# Patient Record
Sex: MALE | Race: WHITE | NOT HISPANIC OR LATINO | Employment: FULL TIME | URBAN - METROPOLITAN AREA
[De-identification: names, ages, dates, MRNs, and addresses within clinical notes are randomized per-mention and may not be internally consistent; named-entity substitution may affect disease eponyms.]

---

## 2017-01-09 DIAGNOSIS — E78.2 COMBINED HYPERLIPIDEMIA ASSOCIATED WITH TYPE 2 DIABETES MELLITUS: ICD-10-CM

## 2017-01-09 DIAGNOSIS — E11.69 COMBINED HYPERLIPIDEMIA ASSOCIATED WITH TYPE 2 DIABETES MELLITUS: ICD-10-CM

## 2017-01-09 RX ORDER — FUROSEMIDE 40 MG/1
40 TABLET ORAL 2 TIMES DAILY
Qty: 90 TABLET | Refills: 3 | Status: SHIPPED | OUTPATIENT
Start: 2017-01-09 | End: 2018-01-25 | Stop reason: SDUPTHER

## 2017-01-09 RX ORDER — GLIPIZIDE 5 MG/1
TABLET ORAL
Qty: 30 TABLET | Refills: 2 | Status: SHIPPED | OUTPATIENT
Start: 2017-01-09 | End: 2017-04-09 | Stop reason: SDUPTHER

## 2017-01-27 DIAGNOSIS — E11.9 TYPE 2 DIABETES MELLITUS WITHOUT COMPLICATION: ICD-10-CM

## 2017-02-08 DIAGNOSIS — G62.9 PERIPHERAL POLYNEUROPATHY: ICD-10-CM

## 2017-02-08 RX ORDER — GABAPENTIN 600 MG/1
TABLET ORAL
Qty: 180 TABLET | Refills: 11 | Status: SHIPPED | OUTPATIENT
Start: 2017-02-08 | End: 2018-01-20 | Stop reason: SDUPTHER

## 2017-02-15 ENCOUNTER — PATIENT MESSAGE (OUTPATIENT)
Dept: FAMILY MEDICINE | Facility: CLINIC | Age: 62
End: 2017-02-15

## 2017-02-15 DIAGNOSIS — G62.9 PERIPHERAL POLYNEUROPATHY: ICD-10-CM

## 2017-02-15 RX ORDER — PREGABALIN 75 MG/1
CAPSULE ORAL
Qty: 60 CAPSULE | Refills: 5 | Status: CANCELLED | OUTPATIENT
Start: 2017-02-15

## 2017-02-16 RX ORDER — PREGABALIN 75 MG/1
75 CAPSULE ORAL 2 TIMES DAILY
Qty: 60 CAPSULE | Refills: 5 | Status: SHIPPED | OUTPATIENT
Start: 2017-02-16 | End: 2017-03-14

## 2017-03-14 ENCOUNTER — OFFICE VISIT (OUTPATIENT)
Dept: CARDIOLOGY | Facility: CLINIC | Age: 62
End: 2017-03-14
Payer: MEDICAID

## 2017-03-14 ENCOUNTER — CLINICAL SUPPORT (OUTPATIENT)
Dept: SMOKING CESSATION | Facility: CLINIC | Age: 62
End: 2017-03-14
Payer: COMMERCIAL

## 2017-03-14 VITALS
DIASTOLIC BLOOD PRESSURE: 90 MMHG | WEIGHT: 218.25 LBS | SYSTOLIC BLOOD PRESSURE: 150 MMHG | HEART RATE: 59 BPM | HEIGHT: 67 IN | BODY MASS INDEX: 34.26 KG/M2 | OXYGEN SATURATION: 97 %

## 2017-03-14 DIAGNOSIS — Z95.810 AICD (AUTOMATIC CARDIOVERTER/DEFIBRILLATOR) PRESENT: ICD-10-CM

## 2017-03-14 DIAGNOSIS — F17.200 NICOTINE DEPENDENCE: Primary | ICD-10-CM

## 2017-03-14 DIAGNOSIS — I50.22 CHRONIC SYSTOLIC CHF (CONGESTIVE HEART FAILURE): ICD-10-CM

## 2017-03-14 DIAGNOSIS — I25.10 CORONARY ARTERY DISEASE DUE TO LIPID RICH PLAQUE: Primary | ICD-10-CM

## 2017-03-14 DIAGNOSIS — I48.0 PAROXYSMAL ATRIAL FIBRILLATION: ICD-10-CM

## 2017-03-14 DIAGNOSIS — I73.9 PAD (PERIPHERAL ARTERY DISEASE): ICD-10-CM

## 2017-03-14 DIAGNOSIS — Z72.0 TOBACCO ABUSE: ICD-10-CM

## 2017-03-14 DIAGNOSIS — I25.83 CORONARY ARTERY DISEASE DUE TO LIPID RICH PLAQUE: Primary | ICD-10-CM

## 2017-03-14 DIAGNOSIS — I10 ESSENTIAL HYPERTENSION: ICD-10-CM

## 2017-03-14 DIAGNOSIS — E78.5 DYSLIPIDEMIA: ICD-10-CM

## 2017-03-14 PROCEDURE — 99214 OFFICE O/P EST MOD 30 MIN: CPT | Mod: S$PBB,,, | Performed by: INTERNAL MEDICINE

## 2017-03-14 PROCEDURE — 99999 PR PBB SHADOW E&M-EST. PATIENT-LVL I: CPT | Mod: PBBFAC,,,

## 2017-03-14 PROCEDURE — 99404 PREV MED CNSL INDIV APPRX 60: CPT | Mod: S$GLB,,,

## 2017-03-14 PROCEDURE — 99213 OFFICE O/P EST LOW 20 MIN: CPT | Mod: PBBFAC | Performed by: INTERNAL MEDICINE

## 2017-03-14 PROCEDURE — 99999 PR PBB SHADOW E&M-EST. PATIENT-LVL III: CPT | Mod: PBBFAC,,, | Performed by: INTERNAL MEDICINE

## 2017-03-14 RX ORDER — IBUPROFEN 200 MG
1 TABLET ORAL DAILY
Qty: 14 PATCH | Refills: 0 | Status: SHIPPED | OUTPATIENT
Start: 2017-03-14 | End: 2017-04-14

## 2017-03-14 RX ORDER — DM/P-EPHED/ACETAMINOPH/DOXYLAM 30-7.5/3
2 LIQUID (ML) ORAL
Qty: 108 LOZENGE | Refills: 0 | Status: SHIPPED | OUTPATIENT
Start: 2017-03-14 | End: 2017-04-11

## 2017-03-14 RX ORDER — VARENICLINE TARTRATE 0.5 (11)-1
KIT ORAL
Qty: 1 PACKAGE | Refills: 0 | Status: SHIPPED | OUTPATIENT
Start: 2017-03-14 | End: 2017-04-14

## 2017-03-14 RX ORDER — HYDRALAZINE HYDROCHLORIDE 10 MG/1
TABLET, FILM COATED ORAL
Refills: 0 | COMMUNITY
Start: 2017-02-24

## 2017-03-14 RX ORDER — METOLAZONE 2.5 MG/1
TABLET ORAL
Refills: 0 | COMMUNITY
Start: 2017-02-05

## 2017-03-14 RX ORDER — TORSEMIDE 20 MG/1
TABLET ORAL
Refills: 0 | COMMUNITY
Start: 2017-03-12

## 2017-03-14 NOTE — PROGRESS NOTES
Subjective:    Patient ID:  Silvano Becker is a 61 y.o. male who presents for follow-up of Follow-up      Coronary Artery Disease   Symptoms include palpitations.     Previous history:  Here for follow-up of peripheral vascular disease.  He's now post left iliac stenting.  He's here for follow-up post procedure.  He says he's noticed significant dyspnea difference post revascularization.  He now feels good in both legs without pain for the first time in several years.  He does still deal with some mild neuropathy distally mainly on the right.  Otherwise his cardiopulmonary symptoms are stable.  He's not expressing chest pain, shortness of breath or palpitations that are worsened.  He denies any PND, orthopnea or lower edema.  He's not expressing dizziness, presyncope or syncope.  He says his been walking and trying to not lose weight.  He still continues to smoke however.    Today:  Here for follow-up of CAD, systolic heart failure and peripheral vascular disease.  He's had as good days and bad days.  He complains of slow heart rate with occasional fast heart rates alternating.  He denies any dizziness, presyncope or syncope.  He does have baseline shortness of breath and does minimal activity.  He still trying to discontinue smoking is participating in the cessation clinic.  He denies any PND, orthopnea or lower edema.  He's been staying away from salt.  He does not exercise routinely.  His daughter was recently diagnosed with metastatic cervical cancer.      Review of Systems   Constitution: Negative.   HENT: Negative.    Eyes: Negative.    Cardiovascular: Positive for dyspnea on exertion and palpitations. Negative for irregular heartbeat, near-syncope, orthopnea, paroxysmal nocturnal dyspnea and syncope.   Skin: Negative.    Musculoskeletal: Negative.         Right foot pain   Gastrointestinal: Negative for abdominal pain, constipation and diarrhea.   Genitourinary: Negative for dysuria.   Neurological: Positive  for numbness and paresthesias.   Psychiatric/Behavioral: Negative.         Objective:    Physical Exam   Constitutional: He is oriented to person, place, and time. He appears well-developed and well-nourished. No distress.   HENT:   Head: Normocephalic and atraumatic.   Eyes: Conjunctivae and EOM are normal. Pupils are equal, round, and reactive to light.   Neck: Normal range of motion. Neck supple. No thyromegaly present.   Cardiovascular: Normal rate, regular rhythm and normal heart sounds.    No murmur heard.  Pulmonary/Chest: Effort normal. No respiratory distress. He has no wheezes. He has no rales. He exhibits no tenderness.   Decreased coarse breath sounds bilaterally   Abdominal: Soft. Bowel sounds are normal.   Musculoskeletal: He exhibits no edema.   Neurological: He is alert and oriented to person, place, and time.   Skin: Skin is warm and dry.   Psychiatric: He has a normal mood and affect. His behavior is normal.           Echo:  CONCLUSIONS     1 - Moderately depressed left ventricular systolic function (EF 30-35%).     2 - Eccentric hypertrophy.     3 - Left ventricular diastolic dysfunction.     4 - Pulmonary hypertension. The estimated PA systolic pressure is 59 mmHg.     5 - Severe left atrial enlargement.     6 - Trivial pericardial effusion.     Lower extremity arterial ultrasound:   Abnormal waveforms are seen throughout both lower extremities. The patient does have a history of stents which will cause abnormalities in the waveforms. This and some element of peripheral vascular disease is  thought to account for the abnormal waveforms. Further assessment may be obtained with MARGARITA.    Assessment:       1. Coronary artery disease due to lipid rich plaque    2. PAD (peripheral artery disease)    3. Chronic systolic CHF (congestive heart failure)    4. AICD (automatic cardioverter/defibrillator) present    5. Essential hypertension    6. Dyslipidemia    7. Paroxysmal atrial fibrillation    8. Tobacco  abuse         Plan:       -cont med tx  -counseled on tobacco, referred to clinic  -Encouraged diet and exercise  -Trial of gabapentin for possible neuropathic pain  -MARGARITA post revascularization  -Only on ASA/Plavix for oral anticoagulation with prior history of GI bleed (*aware of the risks/benefits off OAC)    Clinic in 1 month w/ Medtronic check

## 2017-03-14 NOTE — MR AVS SNAPSHOT
[facility]  [address]  [place] [address]  Phone: [phone]                  Silvano Becker   3/14/2017 2:00 PM   Office Visit    Description:  Male : 1955   Provider:  Jimmie Andersen MD   Department:  [facility]           Reason for Visit     Follow-up                To Do List           Future Appointments        Provider Department Dept Phone    3/14/2017 2:00 PM Jimmie Andersen MD [facility] [phone]      Goals (5 Years of Data)     None      [facility]     [facility] -  Assistance  Registered nurses in the [facility] Center provide clinical advisement, health education, appointment booking, and other advisory services.  Call for this free service at [phone].             Medications           Message regarding Medications     Verify the changes and/or additions to your medication regime listed below are the same as discussed with your clinician today.  If any of these changes or additions are incorrect, please notify your healthcare provider.        STOP taking these medications     pregabalin (LYRICA) 75 MG capsule Take 1 capsule (75 mg total) by mouth 2 (two) times daily.           Verify that the below list of medications is an accurate representation of the medications you are currently taking.  If none reported, the list may be blank. If incorrect, please contact your healthcare provider. Carry this list with you in case of emergency.           Current Medications     ACCU-CHEK FRANK Misc     albuterol (PROAIR HFA) 90 mcg/actuation inhaler Inhale 1-2 puffs into the lungs every 4 (four) hours as needed for Wheezing.    allopurinol (ZYLOPRIM) 300 MG tablet Take 1 tablet (300 mg total) by mouth once daily.    amiodarone (PACERONE) 200 MG Tab Take 1 tablet (200 mg total) by mouth every morning.    amlodipine (NORVASC) 5 MG tablet Take 1 tablet (5 mg total) by mouth once daily.    atorvastatin (LIPITOR) 80  MG tablet Take 1 tablet (80 mg total) by mouth every evening.    blood sugar diagnostic Strp accu-chek joseph smartview meter    carvedilol (COREG) 25 MG tablet Take 1 tablet (25 mg total) by mouth 2 (two) times daily.    clopidogrel (PLAVIX) 75 mg tablet Take 1 tablet (75 mg total) by mouth once daily.    colchicine 0.6 mg tablet Take 0.6 mg by mouth daily as needed.     fluticasone-vilanterol (BREO) 200-25 mcg/dose DsDv diskus inhaler Inhale 1 puff into the lungs once daily.    furosemide (LASIX) 40 MG tablet Take 1 tablet (40 mg total) by mouth 2 (two) times daily.    gabapentin (NEURONTIN) 600 MG tablet take 2 tablets by mouth three times a day    glipiZIDE (GLUCOTROL) 5 MG tablet take 1 tablet by mouth every morning before BREAKFAST    hydrALAZINE (APRESOLINE) 10 MG tablet     metOLazone (ZAROXOLYN) 2.5 MG tablet     nicotine (NICODERM CQ) 21 mg/24 hr Place 1 patch onto the skin once daily.    nicotine polacrilex 2 MG Lozg Take 1 lozenge (2 mg total) by mouth as needed (1 per hour as needed in place of a cigarette, max of 15 per day).    nitroGLYCERIN (NITROSTAT) 0.4 MG SL tablet Place 1 tablet (0.4 mg total) under the tongue every 5 (five) minutes as needed for Chest pain.    pantoprazole (PROTONIX) 40 MG tablet Take 1 tablet (40 mg total) by mouth once daily.    spironolactone (ALDACTONE) 25 MG tablet Take 1 tablet (25 mg total) by mouth once daily.    tiotropium bromide 2.5 mcg/actuation Mist Inhale 5 mcg into the lungs once daily. This is a a controller medication and should be taken every day    torsemide (DEMADEX) 20 MG Tab     varenicline (CHANTIX STARTING MONTH BOX) 0.5 mg (11)- 1 mg (42) tablet Take one 0.5mg tab by mouth once daily X3 days,then increase to one 0.5mg tab twice daily X4 days,then increase to one 1mg tab twice daily           Clinical Reference Information           Your Vitals Were     BP Pulse Height Weight SpO2 BMI    150/90 (BP Location: Right arm, Patient Position: Sitting, BP Method:  "Automatic) 59 5' 7" (1.702 m) 99 kg (218 lb 4.1 oz) 97% 34.18 kg/m2      Blood Pressure          Most Recent Value    BP  (!)  150/90      Allergies as of 3/14/2017     Ace Inhibitors    Arb-angiotensin Receptor Antagonist    Lisinopril      Immunizations Administered on Date of Encounter - 3/14/2017     None      Smoking Cessation     If you would like to quit smoking:   You may be eligible for free services if you are a [place] resident and started smoking cigarettes before September 1, 1988.  Call the Smoking Cessation Trust (SCT) toll free at ([phone] 075-4016 or ([phone] 645-7478.   Call 7-700-QUIT-NOW if you do not meet the above criteria.            Language Assistance Services     ATTENTION: Language assistance services are available, free of charge. Please call [phone].      ATENCIÓN: Si doug steele, tiene a sloan disposición servicios gratuitos de asistencia lingüística. Llame al [phone].     CHÚ Ý: N?u b?n nói Ti?ng Vi?t, có các d?ch v? h? tr? ngôn ng? mi?n phí dành cho b?n. G?i s? [phone].         [facility] complies with applicable Federal civil rights laws and does not discriminate on the basis of race, color, national origin, age, disability, or sex.        "

## 2017-04-09 DIAGNOSIS — E78.2 COMBINED HYPERLIPIDEMIA ASSOCIATED WITH TYPE 2 DIABETES MELLITUS: ICD-10-CM

## 2017-04-09 DIAGNOSIS — E11.69 COMBINED HYPERLIPIDEMIA ASSOCIATED WITH TYPE 2 DIABETES MELLITUS: ICD-10-CM

## 2017-04-10 ENCOUNTER — TELEPHONE (OUTPATIENT)
Dept: SMOKING CESSATION | Facility: CLINIC | Age: 62
End: 2017-04-10

## 2017-04-10 RX ORDER — GLIPIZIDE 5 MG/1
TABLET ORAL
Qty: 30 TABLET | Refills: 2 | Status: SHIPPED | OUTPATIENT
Start: 2017-04-10 | End: 2017-07-10 | Stop reason: SDUPTHER

## 2017-04-18 ENCOUNTER — OFFICE VISIT (OUTPATIENT)
Dept: CARDIOLOGY | Facility: CLINIC | Age: 62
End: 2017-04-18
Payer: MEDICAID

## 2017-04-18 ENCOUNTER — TELEPHONE (OUTPATIENT)
Dept: SMOKING CESSATION | Facility: CLINIC | Age: 62
End: 2017-04-18

## 2017-04-18 VITALS
OXYGEN SATURATION: 97 % | BODY MASS INDEX: 34.21 KG/M2 | DIASTOLIC BLOOD PRESSURE: 61 MMHG | WEIGHT: 218 LBS | SYSTOLIC BLOOD PRESSURE: 114 MMHG | HEIGHT: 67 IN | HEART RATE: 52 BPM

## 2017-04-18 DIAGNOSIS — I25.83 CORONARY ARTERY DISEASE DUE TO LIPID RICH PLAQUE: Primary | ICD-10-CM

## 2017-04-18 DIAGNOSIS — E78.5 DYSLIPIDEMIA: ICD-10-CM

## 2017-04-18 DIAGNOSIS — I10 ESSENTIAL HYPERTENSION: ICD-10-CM

## 2017-04-18 DIAGNOSIS — I73.9 PAD (PERIPHERAL ARTERY DISEASE): ICD-10-CM

## 2017-04-18 DIAGNOSIS — Z72.0 TOBACCO ABUSE: ICD-10-CM

## 2017-04-18 DIAGNOSIS — Z95.810 AICD (AUTOMATIC CARDIOVERTER/DEFIBRILLATOR) PRESENT: ICD-10-CM

## 2017-04-18 DIAGNOSIS — I25.10 CORONARY ARTERY DISEASE DUE TO LIPID RICH PLAQUE: Primary | ICD-10-CM

## 2017-04-18 DIAGNOSIS — I50.22 CHRONIC SYSTOLIC CHF (CONGESTIVE HEART FAILURE): ICD-10-CM

## 2017-04-18 DIAGNOSIS — I48.0 PAROXYSMAL ATRIAL FIBRILLATION: ICD-10-CM

## 2017-04-18 PROCEDURE — 99214 OFFICE O/P EST MOD 30 MIN: CPT | Mod: S$PBB,,, | Performed by: INTERNAL MEDICINE

## 2017-04-18 PROCEDURE — 99213 OFFICE O/P EST LOW 20 MIN: CPT | Mod: PBBFAC | Performed by: INTERNAL MEDICINE

## 2017-04-18 PROCEDURE — 93289 INTERROG DEVICE EVAL HEART: CPT | Mod: 26,S$PBB,, | Performed by: INTERNAL MEDICINE

## 2017-04-18 PROCEDURE — 93289 INTERROG DEVICE EVAL HEART: CPT | Mod: PBBFAC | Performed by: INTERNAL MEDICINE

## 2017-04-18 PROCEDURE — 99999 PR PBB SHADOW E&M-EST. PATIENT-LVL III: CPT | Mod: PBBFAC,,, | Performed by: INTERNAL MEDICINE

## 2017-04-18 RX ORDER — CLONIDINE HYDROCHLORIDE 0.1 MG/1
0.1 TABLET ORAL DAILY
Refills: 0 | COMMUNITY
Start: 2017-03-22

## 2017-04-18 NOTE — MR AVS SNAPSHOT
[facility]  [address] [facility] [address] [place] [address]  Phone: [phone]                  Silvano Becker   2017 2:20 PM   Office Visit    Description:  Male : 1955   Provider:  Jimmie Andersen MD   Department:  [facility]           Reason for Visit     Coronary Artery Disease     Pacemaker Check                To Do List           Future Appointments        Provider Department Dept Phone    2017 2:20 PM Jimmie Andersen MD [facility] [phone]      Goals (5 Years of Data)     None      [facility]     [facility] On Call Nurse Care Line -  Assistance  Unless otherwise directed by your provider, please contact [facility], our nurse care line that is available for  assistance.     Registered nurses in the [facility] provide: appointment scheduling, clinical advisement, health education, and other advisory services.  Call: [phone] (toll free)               Medications           Message regarding Medications     Verify the changes and/or additions to your medication regime listed below are the same as discussed with your clinician today.  If any of these changes or additions are incorrect, please notify your healthcare provider.             Verify that the below list of medications is an accurate representation of the medications you are currently taking.  If none reported, the list may be blank. If incorrect, please contact your healthcare provider. Carry this list with you in case of emergency.           Current Medications     ACCU-CHEK FRANK Misc     albuterol (PROAIR HFA) 90 mcg/actuation inhaler Inhale 1-2 puffs into the lungs every 4 (four) hours as needed for Wheezing.    allopurinol (ZYLOPRIM) 300 MG tablet Take 1 tablet (300 mg total) by mouth once daily.    amiodarone (PACERONE) 200 MG Tab Take 1 tablet (200 mg total) by mouth every morning.    amlodipine (NORVASC) 5 MG tablet Take 1 tablet (5 mg total) by  "mouth once daily.    atorvastatin (LIPITOR) 80 MG tablet Take 1 tablet (80 mg total) by mouth every evening.    blood sugar diagnostic Strp accu-chek joseph smartview meter    carvedilol (COREG) 25 MG tablet Take 1 tablet (25 mg total) by mouth 2 (two) times daily.    cloNIDine (CATAPRES) 0.1 MG tablet Take 0.1 mg by mouth once daily.    clopidogrel (PLAVIX) 75 mg tablet Take 1 tablet (75 mg total) by mouth once daily.    colchicine 0.6 mg tablet Take 0.6 mg by mouth daily as needed.     fluticasone-vilanterol (BREO) 200-25 mcg/dose DsDv diskus inhaler Inhale 1 puff into the lungs once daily.    furosemide (LASIX) 40 MG tablet Take 1 tablet (40 mg total) by mouth 2 (two) times daily.    gabapentin (NEURONTIN) 600 MG tablet take 2 tablets by mouth three times a day    glipiZIDE (GLUCOTROL) 5 MG tablet take 1 tablet by mouth every morning before BREAKFAST    hydrALAZINE (APRESOLINE) 10 MG tablet     metOLazone (ZAROXOLYN) 2.5 MG tablet     nitroGLYCERIN (NITROSTAT) 0.4 MG SL tablet Place 1 tablet (0.4 mg total) under the tongue every 5 (five) minutes as needed for Chest pain.    pantoprazole (PROTONIX) 40 MG tablet Take 1 tablet (40 mg total) by mouth once daily.    spironolactone (ALDACTONE) 25 MG tablet Take 1 tablet (25 mg total) by mouth once daily.    tiotropium bromide 2.5 mcg/actuation Mist Inhale 5 mcg into the lungs once daily. This is a a controller medication and should be taken every day    torsemide (DEMADEX) 20 MG Tab            Clinical Reference Information           Your Vitals Were     BP Pulse Height Weight SpO2 BMI    114/61 (BP Location: Left arm, Patient Position: Sitting, BP Method: Automatic) 52 5' 7" (1.702 m) 98.9 kg (218 lb) 97% 34.14 kg/m2      Blood Pressure          Most Recent Value    BP  114/61      Allergies as of 4/18/2017     Ace Inhibitors    Arb-angiotensin Receptor Antagonist    Lisinopril      Immunizations Administered on Date of Encounter - 4/18/2017     None      Smoking " Cessation     If you would like to quit smoking:   You may be eligible for free services if you are a [place] resident and started smoking cigarettes before September 1, 1988.  Call the Smoking Cessation Trust (SCT) toll free at (120) 463-2974 or (483) 294-6576.   Call 1-800-QUIT-NOW if you do not meet the above criteria.   Contact us via email: [email or web address]   View our website for more information: [email or web address]        Language Assistance Services     ATTENTION: Language assistance services are available, free of charge. Please call [phone].      ATENCIÓN: Si habla español, tiene a sloan disposición servicios gratuitos de asistencia lingüística. Llame al [phone].     CHÚ Ý: N?u b?n nói Ti?ng Vi?t, có các d?ch v? h? tr? ngôn ng? mi?n phí dành cho b?n. G?i s? [phone].         [facility] [place] [place] complies with applicable Federal civil rights laws and does not discriminate on the basis of race, color, national origin, age, disability, or sex.

## 2017-04-18 NOTE — PROGRESS NOTES
Subjective:    Patient ID:  Silvano Becker is a 61 y.o. male who presents for follow-up of Coronary Artery Disease and Pacemaker Check      Coronary Artery Disease     Previous history:  Here for follow-up of peripheral vascular disease.  He's now post left iliac stenting.  He's here for follow-up post procedure.  He says he's noticed significant dyspnea difference post revascularization.  He now feels good in both legs without pain for the first time in several years.  He does still deal with some mild neuropathy distally mainly on the right.  Otherwise his cardiopulmonary symptoms are stable.  He's not expressing chest pain, shortness of breath or palpitations that are worsened.  He denies any PND, orthopnea or lower edema.  He's not expressing dizziness, presyncope or syncope.  He says his been walking and trying to not lose weight.  He still continues to smoke however.    Today:  Here for follow-up of CAD, systolic heart failure and peripheral vascular disease.  He's had as good days and bad days.  He complains of slow heart rate with occasional fast heart rates alternating.  He denies any dizziness, presyncope or syncope.  He does have baseline shortness of breath and does minimal activity.  He still trying to discontinue smoking is participating in the cessation clinic.  He denies any PND, orthopnea or lower edema.  He's been staying away from salt.  He does not exercise routinely.  His daughter was recently diagnosed with metastatic cervical cancer.      Review of Systems   Constitution: Negative.   HENT: Negative.    Eyes: Negative.    Cardiovascular: Positive for dyspnea on exertion. Negative for irregular heartbeat, near-syncope, orthopnea, paroxysmal nocturnal dyspnea and syncope.   Skin: Negative.    Musculoskeletal: Negative.         Right foot pain   Gastrointestinal: Negative for abdominal pain, constipation and diarrhea.   Genitourinary: Negative for dysuria.   Neurological: Positive for numbness  and paresthesias.   Psychiatric/Behavioral: Negative.         Objective:    Physical Exam   Constitutional: He is oriented to person, place, and time. He appears well-developed and well-nourished. No distress.   HENT:   Head: Normocephalic and atraumatic.   Eyes: Conjunctivae and EOM are normal. Pupils are equal, round, and reactive to light.   Neck: Normal range of motion. Neck supple. No thyromegaly present.   Cardiovascular: Normal rate, regular rhythm and normal heart sounds.    No murmur heard.  Pulmonary/Chest: Effort normal. No respiratory distress. He has no wheezes. He has no rales. He exhibits no tenderness.   Decreased coarse breath sounds bilaterally   Abdominal: Soft. Bowel sounds are normal.   Musculoskeletal: He exhibits no edema.   Neurological: He is alert and oriented to person, place, and time.   Skin: Skin is warm and dry.   Psychiatric: He has a normal mood and affect. His behavior is normal.           Echo:  CONCLUSIONS     1 - Moderately depressed left ventricular systolic function (EF 30-35%).     2 - Eccentric hypertrophy.     3 - Left ventricular diastolic dysfunction.     4 - Pulmonary hypertension. The estimated PA systolic pressure is 59 mmHg.     5 - Severe left atrial enlargement.     6 - Trivial pericardial effusion.     Lower extremity arterial ultrasound:   Abnormal waveforms are seen throughout both lower extremities. The patient does have a history of stents which will cause abnormalities in the waveforms. This and some element of peripheral vascular disease is  thought to account for the abnormal waveforms. Further assessment may be obtained with MARGARITA.    Assessment:       1. Coronary artery disease due to lipid rich plaque    2. PAD (peripheral artery disease)    3. Chronic systolic CHF (congestive heart failure)    4. AICD (automatic cardioverter/defibrillator) present    5. Essential hypertension    6. Dyslipidemia    7. Paroxysmal atrial fibrillation    8. Tobacco abuse          Plan:       -cont med tx  -counseled on tobacco, referred to clinic  -Encouraged diet and exercise  -Trial of gabapentin for possible neuropathic pain  -MARGARITA post revascularization next visit  -Only on ASA/Plavix for oral anticoagulation with prior history of GI bleed (*aware of the risks/benefits off OAC)    Clinic in 6 month         ICD Device Check: (Medtronic)     Indication : Primary prevention     RV 7.8 mV, 380 ohms, 0.75 mV @ 0.6 ms  VVI 40     No episode, No changes     F/u 6 mos

## 2017-04-20 ENCOUNTER — TELEPHONE (OUTPATIENT)
Dept: SMOKING CESSATION | Facility: CLINIC | Age: 62
End: 2017-04-20

## 2017-04-21 DIAGNOSIS — E11.9 TYPE 2 DIABETES MELLITUS WITHOUT COMPLICATION: ICD-10-CM

## 2017-06-04 NOTE — PROGRESS NOTES
Chief Complaint  Chief Complaint   Patient presents with    Diabetes     f/u    Hypertension     f/u    Hyperlipidemia     f/u    COPD     f/u       HPI  Silvano Becker is a 61 y.o. male with multiple medical diagnoses as listed in the medical history and problem list that presents for f/u DM, HLD, HTN, COPD.  Pt is known to me with his last appointment 09/2016.  He was recently seen by his cardiologist and is not due back to see them until Oct.  Diabetes has been complicated by PAD . Their diabetes has been well controlled due to dietary adherence and medication adherence.  He denies CP, SOB, hypoglycemic symptoms.  He is still having a fair amount of neuropathy but reports that pain is manageable with the gabapentin.  He is still smoking.  Cannot attend smoking cessation clinic due to time constraints from caring for his daughter that is undergoing chemotherapy for cervical cancer.  He has been suffering with constipation for some time now.  OTC medication no longer helping.  He may go 7-8 days without a BM.  With softeners and laxative he will have a BM but then goes another week or so without being able to have BM.  No BRBPR or melena.        PAST MEDICAL HISTORY:  Past Medical History:   Diagnosis Date    Angioedema     Anticoagulant long-term use     Atrial fibrillation     CHF (congestive heart failure)     COPD (chronic obstructive pulmonary disease)     Coronary artery disease     Diabetes mellitus     Encounter for blood transfusion     Gout     High fever     Hypertension     ICD (implantable cardioverter-defibrillator) in place     Malignant hyperthermia     PAD (peripheral artery disease)        PAST SURGICAL HISTORY:  Past Surgical History:   Procedure Laterality Date    ABDOMINAL SURGERY      APPENDECTOMY      CARDIAC DEFIBRILLATOR PLACEMENT      CARDIAC STENTS       X 1    gastric bleed      HEMORRHOID SURGERY      peripheral stents      right leg and right iliac according  to patient       SOCIAL HISTORY:  Social History     Social History    Marital status:      Spouse name: N/A    Number of children: N/A    Years of education: N/A     Occupational History    Not on file.     Social History Main Topics    Smoking status: Current Every Day Smoker     Packs/day: 1.50     Years: 40.00     Last attempt to quit: 10/13/2016    Smokeless tobacco: Never Used      Comment: recently quit smoking    Alcohol use 0.0 oz/week      Comment: occasional    Drug use: No    Sexual activity: Not on file     Other Topics Concern    Not on file     Social History Narrative    No narrative on file       FAMILY HISTORY:  Family History   Problem Relation Age of Onset    Heart disease Mother        ALLERGIES AND MEDICATIONS: updated and reviewed.  Review of patient's allergies indicates:   Allergen Reactions    Ace inhibitors Edema     Angioedema [facility] at [facility] in ICU no intubation. ALL ACE Inhibitors.    Arb-angiotensin receptor antagonist Swelling     Angioedema while on ACE-I    Lisinopril Swelling     Current Outpatient Prescriptions   Medication Sig Dispense Refill    ACCU-CHEK FRANK Misc   1    albuterol (PROAIR HFA) 90 mcg/actuation inhaler Inhale 1-2 puffs into the lungs every 4 (four) hours as needed for Wheezing. 18 g 5    allopurinol (ZYLOPRIM) 300 MG tablet Take 1 tablet (300 mg total) by mouth once daily. (Patient taking differently: Take 300 mg by mouth every morning. ) 90 tablet 3    amiodarone (PACERONE) 200 MG Tab Take 1 tablet (200 mg total) by mouth every morning. 60 tablet 3    amlodipine (NORVASC) 5 MG tablet Take 1 tablet (5 mg total) by mouth once daily. 30 tablet 6    atorvastatin (LIPITOR) 80 MG tablet Take 1 tablet (80 mg total) by mouth every evening. 30 tablet 11    blood sugar diagnostic Strp accu-chek frank smartview meter 100 each 11    carvedilol (COREG) 25 MG tablet Take 1 tablet (25 mg total) by mouth 2 (two) times daily. 180 tablet 3     cloNIDine (CATAPRES) 0.1 MG tablet Take 0.1 mg by mouth once daily.  0    clopidogrel (PLAVIX) 75 mg tablet Take 1 tablet (75 mg total) by mouth once daily. 30 tablet 11    colchicine 0.6 mg tablet Take 0.6 mg by mouth daily as needed.   0    fluticasone-vilanterol (BREO) 200-25 mcg/dose DsDv diskus inhaler Inhale 1 puff into the lungs once daily. (Patient taking differently: Inhale 1 puff into the lungs every evening. ) 60 each 6    furosemide (LASIX) 40 MG tablet Take 1 tablet (40 mg total) by mouth 2 (two) times daily. 90 tablet 3    gabapentin (NEURONTIN) 600 MG tablet take 2 tablets by mouth three times a day 180 tablet 11    glipiZIDE (GLUCOTROL) 5 MG tablet take 1 tablet by mouth every morning before BREAKFAST 30 tablet 2    hydrALAZINE (APRESOLINE) 10 MG tablet   0    isosorbide mononitrate (IMDUR) 30 MG 24 hr tablet Take 30 mg by mouth every morning.  0    metOLazone (ZAROXOLYN) 2.5 MG tablet   0    pantoprazole (PROTONIX) 40 MG tablet Take 1 tablet (40 mg total) by mouth once daily. 30 tablet 6    spironolactone (ALDACTONE) 25 MG tablet Take 1 tablet (25 mg total) by mouth once daily. 90 tablet 3    tiotropium bromide 2.5 mcg/actuation Mist Inhale 5 mcg into the lungs once daily. This is a a controller medication and should be taken every day (Patient taking differently: Inhale 5 mcg into the lungs every evening. This is a a controller medication and should be taken every day) 4 g 6    torsemide (DEMADEX) 20 MG Tab   0    linaclotide 72 mcg Cap Take 1 capsule (72 mcg total) by mouth once daily. 90 capsule 3    nitroGLYCERIN (NITROSTAT) 0.4 MG SL tablet Place 1 tablet (0.4 mg total) under the tongue every 5 (five) minutes as needed for Chest pain. 60 tablet 3     No current facility-administered medications for this visit.          ROS  Review of Systems   Constitutional: Negative for chills, fever and unexpected weight change.   HENT: Negative for congestion, dental problem, ear pain,  "hearing loss, rhinorrhea, sore throat and trouble swallowing.    Eyes: Negative for discharge, redness and visual disturbance.   Respiratory: Negative for cough, chest tightness, shortness of breath and wheezing.    Cardiovascular: Positive for palpitations. Negative for chest pain and leg swelling.   Gastrointestinal: Positive for constipation. Negative for abdominal pain, diarrhea, nausea and vomiting.   Endocrine: Negative for polydipsia, polyphagia and polyuria.   Genitourinary: Negative for decreased urine volume, dysuria and hematuria.   Musculoskeletal: Negative for arthralgias and myalgias.   Skin: Negative for color change and rash.   Neurological: Positive for numbness. Negative for dizziness, weakness, light-headedness and headaches.   Psychiatric/Behavioral: Negative for decreased concentration, dysphoric mood, sleep disturbance and suicidal ideas.           Physical Exam  Vitals:    06/05/17 0925   BP: 104/80   BP Location: Left arm   Patient Position: Sitting   BP Method: Manual   Pulse: (!) 48   Temp: 97.8 °F (36.6 °C)   TempSrc: Oral   SpO2: 96%   Weight: 97.9 kg (215 lb 13.3 oz)   Height: 5' 7" (1.702 m)    Body mass index is 33.8 kg/m².  Weight: 97.9 kg (215 lb 13.3 oz)   Height: 5' 7" (170.2 cm)   General appearance: alert, appears stated age, cooperative and no distress  Head: Normocephalic, without obvious abnormality, atraumatic  Eyes: PERRL EOMI conjunctiva clear  Ears: normal TM's and external ear canals both ears  Nose: Nares normal. Septum midline. Mucosa normal. No drainage or sinus tenderness.  Throat: lips, mucosa, and tongue normal; teeth and gums normal  Neck: no adenopathy, no carotid bruit, no JVD, supple, symmetrical, trachea midline and thyroid not enlarged, symmetric, no tenderness/mass/nodules  Lungs: clear to auscultation bilaterally  Heart: no S3 or S4, no click, no rub and bradycardic with regular rhythm  Abdomen: soft, non-tender; bowel sounds normal; no masses,  no " organomegaly  Extremities: extremities normal, atraumatic, no cyanosis or edema  Pulses: 2+ and symmetric  Neurologic: Grossly normal    Protective Sensation (w/ 10 gram monofilament):  Right: Absent  Left: Decreased    Visual Inspection:  Normal -  Bilateral    Pedal Pulses:   Right: Diminshed  Left: Diminshed    Posterior tibialis:   Right:Diminshed  Left: Diminshed         Health Maintenance       Date Due Completion Date    TETANUS VACCINE 08/27/1975 ---    Hemoglobin A1c 01/11/2017 7/11/2016    Lipid Panel 01/26/2017 1/26/2016    Foot Exam 04/12/2017 4/12/2016    Eye Exam 04/12/2017 4/12/2016    Urine Microalbumin 04/12/2017 4/12/2016    Influenza Vaccine 08/01/2017 4/12/2016    Pneumococcal PPSV23 (Medium Risk) (2) 08/27/2022 11/25/2013    Colonoscopy 04/12/2023 4/12/2013 (Done)    Override on 4/12/2013: Done (polyp)            Assessment & Plan  Problem List Items Addressed This Visit        Neuro    Peripheral neuropathy (Chronic)    Overview     Due to PAD         Current Assessment & Plan     Continue gabapentin.           Tobacco dependence (Chronic)    Current Assessment & Plan     Has been unable to attend the smoking cessation clinic due to having to care for his daughter that is undergoing chemotherapy.  Still smoking 1 ppd.  I asked him to call Padmini to discuss telephone follow up.              Pulmonary    COPD (chronic obstructive pulmonary disease) (Chronic)    Current Assessment & Plan     The current medical regimen is effective;  continue present plan and medications.             Cardiac    HTN (hypertension) (Chronic)    Current Assessment & Plan     The current medical regimen is effective;  continue present plan and medications.          Chronic systolic CHF (congestive heart failure) (Chronic)    Current Assessment & Plan     Followed by Dr. Andersen.  He is apparently also seeing a NP at [facility] for more acute fluid balance exacerbations.  It is unclear to me what his exact diuretic regimen is.   Will work on clarifying this with him         PAD (peripheral artery disease) (Chronic)    Current Assessment & Plan     The current medical regimen is effective;  continue present plan and medications.          Diabetes mellitus with peripheral vascular disease - Primary (Chronic)    Current Assessment & Plan     Due for labs and eye exam.  Will adjust glipizide dosing accordingly         Relevant Orders    CBC auto differential    Comprehensive metabolic panel    Lipid panel    Hemoglobin A1c    Microalbumin/creatinine urine ratio    Diabetic Eye Screening Photo    Coronary artery disease due to lipid rich plaque (Chronic)    Current Assessment & Plan     Followed by Cardiology.  Continue medical management.  Smoking cessation as above.          Atrial fibrillation    Relevant Orders    TSH       Fluids/Electrolytes/Nutrition/GI    Pure hyperglyceridemia (Chronic)    Current Assessment & Plan     On statin.  Recheck labs         Chronic constipation    Current Assessment & Plan     Poorly controlled with OTC options. Will start trial of Linzess.          Relevant Medications    linaclotide 72 mcg Cap       Musculoskeletal and Integument    Gouty arthropathy, chronic, without tophi    Current Assessment & Plan     Recheck uric acid level.         Relevant Orders    Uric acid      Other Visit Diagnoses    None.           Health Maintenance reviewed, as above.    Follow-up: Return for 3-6 months pending lab results .

## 2017-06-05 ENCOUNTER — OFFICE VISIT (OUTPATIENT)
Dept: FAMILY MEDICINE | Facility: CLINIC | Age: 62
End: 2017-06-05
Payer: MEDICAID

## 2017-06-05 VITALS
OXYGEN SATURATION: 96 % | HEART RATE: 48 BPM | SYSTOLIC BLOOD PRESSURE: 104 MMHG | HEIGHT: 67 IN | TEMPERATURE: 98 F | DIASTOLIC BLOOD PRESSURE: 80 MMHG | WEIGHT: 215.81 LBS | BODY MASS INDEX: 33.87 KG/M2

## 2017-06-05 DIAGNOSIS — I25.83 CORONARY ARTERY DISEASE DUE TO LIPID RICH PLAQUE: Chronic | ICD-10-CM

## 2017-06-05 DIAGNOSIS — E11.51 DIABETES MELLITUS WITH PERIPHERAL VASCULAR DISEASE: Primary | Chronic | ICD-10-CM

## 2017-06-05 DIAGNOSIS — I50.22 CHRONIC SYSTOLIC CHF (CONGESTIVE HEART FAILURE): Chronic | ICD-10-CM

## 2017-06-05 DIAGNOSIS — E78.1 PURE HYPERGLYCERIDEMIA: Chronic | ICD-10-CM

## 2017-06-05 DIAGNOSIS — I25.10 CORONARY ARTERY DISEASE DUE TO LIPID RICH PLAQUE: Chronic | ICD-10-CM

## 2017-06-05 DIAGNOSIS — F17.200 TOBACCO DEPENDENCE: ICD-10-CM

## 2017-06-05 DIAGNOSIS — I10 ESSENTIAL HYPERTENSION: Chronic | ICD-10-CM

## 2017-06-05 DIAGNOSIS — K59.09 CHRONIC CONSTIPATION: ICD-10-CM

## 2017-06-05 DIAGNOSIS — G62.9 PERIPHERAL POLYNEUROPATHY: ICD-10-CM

## 2017-06-05 DIAGNOSIS — J44.9 CHRONIC OBSTRUCTIVE PULMONARY DISEASE, UNSPECIFIED COPD TYPE: Chronic | ICD-10-CM

## 2017-06-05 DIAGNOSIS — M1A.00X0 GOUTY ARTHROPATHY, CHRONIC, WITHOUT TOPHI: ICD-10-CM

## 2017-06-05 DIAGNOSIS — I48.0 PAROXYSMAL ATRIAL FIBRILLATION: ICD-10-CM

## 2017-06-05 DIAGNOSIS — I73.9 PAD (PERIPHERAL ARTERY DISEASE): Chronic | ICD-10-CM

## 2017-06-05 PROCEDURE — 99213 OFFICE O/P EST LOW 20 MIN: CPT | Mod: PBBFAC,PO | Performed by: INTERNAL MEDICINE

## 2017-06-05 PROCEDURE — 99999 PR PBB SHADOW E&M-EST. PATIENT-LVL III: CPT | Mod: PBBFAC,,, | Performed by: INTERNAL MEDICINE

## 2017-06-05 PROCEDURE — 99214 OFFICE O/P EST MOD 30 MIN: CPT | Mod: S$PBB,,, | Performed by: INTERNAL MEDICINE

## 2017-06-05 RX ORDER — ISOSORBIDE MONONITRATE 30 MG/1
30 TABLET, EXTENDED RELEASE ORAL EVERY MORNING
Refills: 0 | COMMUNITY
Start: 2017-05-12

## 2017-06-05 NOTE — ASSESSMENT & PLAN NOTE
Has been unable to attend the smoking cessation clinic due to having to care for his daughter that is undergoing chemotherapy.  Still smoking 1 ppd.  I asked him to call Padmini to discuss telephone follow up.

## 2017-06-05 NOTE — PATIENT INSTRUCTIONS
Call Padmini about telephone options for smoking cessation.     I will be int ouch with your lab results and follow up

## 2017-06-05 NOTE — ASSESSMENT & PLAN NOTE
Followed by Dr. Andersen.  He is apparently also seeing a NP at [facility] for more acute fluid balance exacerbations.  It is unclear to me what his exact diuretic regimen is.  Will work on clarifying this with him

## 2017-06-08 DIAGNOSIS — K21.9 GASTROESOPHAGEAL REFLUX DISEASE, ESOPHAGITIS PRESENCE NOT SPECIFIED: ICD-10-CM

## 2017-06-08 RX ORDER — PANTOPRAZOLE SODIUM 40 MG/1
TABLET, DELAYED RELEASE ORAL
Qty: 30 TABLET | Refills: 6 | Status: SHIPPED | OUTPATIENT
Start: 2017-06-08 | End: 2018-01-04 | Stop reason: SDUPTHER

## 2017-06-09 ENCOUNTER — CLINICAL SUPPORT (OUTPATIENT)
Dept: OPHTHALMOLOGY | Facility: CLINIC | Age: 62
End: 2017-06-09
Attending: INTERNAL MEDICINE
Payer: MEDICAID

## 2017-06-09 ENCOUNTER — LAB VISIT (OUTPATIENT)
Dept: LAB | Facility: HOSPITAL | Age: 62
End: 2017-06-09
Attending: INTERNAL MEDICINE
Payer: MEDICAID

## 2017-06-09 DIAGNOSIS — E11.51 DIABETES MELLITUS WITH PERIPHERAL VASCULAR DISEASE: Chronic | ICD-10-CM

## 2017-06-09 DIAGNOSIS — I48.0 PAROXYSMAL ATRIAL FIBRILLATION: ICD-10-CM

## 2017-06-09 DIAGNOSIS — M1A.00X0 GOUTY ARTHROPATHY, CHRONIC, WITHOUT TOPHI: ICD-10-CM

## 2017-06-09 LAB
ALBUMIN SERPL BCP-MCNC: 3.7 G/DL
ALP SERPL-CCNC: 121 U/L
ALT SERPL W/O P-5'-P-CCNC: 28 U/L
ANION GAP SERPL CALC-SCNC: 13 MMOL/L
AST SERPL-CCNC: 26 U/L
BASOPHILS # BLD AUTO: 0.03 K/UL
BASOPHILS NFR BLD: 0.3 %
BILIRUB SERPL-MCNC: 0.8 MG/DL
BUN SERPL-MCNC: 57 MG/DL
CALCIUM SERPL-MCNC: 9.7 MG/DL
CHLORIDE SERPL-SCNC: 95 MMOL/L
CHOLEST/HDLC SERPL: 4.5 {RATIO}
CO2 SERPL-SCNC: 27 MMOL/L
CREAT SERPL-MCNC: 2.5 MG/DL
DIFFERENTIAL METHOD: ABNORMAL
EOSINOPHIL # BLD AUTO: 0.2 K/UL
EOSINOPHIL NFR BLD: 1.7 %
ERYTHROCYTE [DISTWIDTH] IN BLOOD BY AUTOMATED COUNT: 18.1 %
EST. GFR  (AFRICAN AMERICAN): 31.3 ML/MIN/1.73 M^2
EST. GFR  (NON AFRICAN AMERICAN): 27.1 ML/MIN/1.73 M^2
ESTIMATED AVG GLUCOSE: 128 MG/DL
GLUCOSE SERPL-MCNC: 101 MG/DL
HBA1C MFR BLD HPLC: 6.1 %
HCT VFR BLD AUTO: 36.1 %
HDL/CHOLESTEROL RATIO: 22.3 %
HDLC SERPL-MCNC: 148 MG/DL
HDLC SERPL-MCNC: 33 MG/DL
HGB BLD-MCNC: 11.7 G/DL
LDLC SERPL CALC-MCNC: 75.2 MG/DL
LYMPHOCYTES # BLD AUTO: 1.9 K/UL
LYMPHOCYTES NFR BLD: 18.1 %
MCH RBC QN AUTO: 30.1 PG
MCHC RBC AUTO-ENTMCNC: 32.4 %
MCV RBC AUTO: 93 FL
MONOCYTES # BLD AUTO: 0.8 K/UL
MONOCYTES NFR BLD: 7.4 %
NEUTROPHILS # BLD AUTO: 7.5 K/UL
NEUTROPHILS NFR BLD: 72.1 %
NONHDLC SERPL-MCNC: 115 MG/DL
PLATELET # BLD AUTO: 272 K/UL
PMV BLD AUTO: 11.4 FL
POTASSIUM SERPL-SCNC: 3.8 MMOL/L
PROT SERPL-MCNC: 7.5 G/DL
RBC # BLD AUTO: 3.89 M/UL
SODIUM SERPL-SCNC: 135 MMOL/L
TRIGL SERPL-MCNC: 199 MG/DL
TSH SERPL DL<=0.005 MIU/L-ACNC: 1.15 UIU/ML
URATE SERPL-MCNC: 6.6 MG/DL
WBC # BLD AUTO: 10.45 K/UL

## 2017-06-09 PROCEDURE — 99211 OFF/OP EST MAY X REQ PHY/QHP: CPT | Mod: PBBFAC,PO

## 2017-06-09 PROCEDURE — 92227 IMG RTA DETCJ/MNTR DS STAFF: CPT | Mod: 50,PBBFAC,PO

## 2017-06-09 PROCEDURE — 99999 PR PBB SHADOW E&M-EST. PATIENT-LVL I: CPT | Mod: PBBFAC,,,

## 2017-06-09 NOTE — PROGRESS NOTES
HPI     60 y/o here for screening for diabetic retinopathy with non-dilated   fundus photos per   Dr. Elaine. Test done by Juliana Bowie.     Last edited by Amanda Guzman on 6/9/2017  8:44 AM. (History)            Assessment /Plan     For exam results, see Encounter Report.    Diabetes mellitus with peripheral vascular disease  -     Diabetic Eye Screening Photo      Please see Dr. Rucker progress note for interpretation.

## 2017-06-11 ENCOUNTER — PATIENT MESSAGE (OUTPATIENT)
Dept: FAMILY MEDICINE | Facility: CLINIC | Age: 62
End: 2017-06-11

## 2017-06-11 DIAGNOSIS — N17.9 AKI (ACUTE KIDNEY INJURY): Primary | ICD-10-CM

## 2017-06-12 ENCOUNTER — TELEPHONE (OUTPATIENT)
Dept: OPHTHALMOLOGY | Facility: CLINIC | Age: 62
End: 2017-06-12

## 2017-06-16 ENCOUNTER — LAB VISIT (OUTPATIENT)
Dept: LAB | Facility: HOSPITAL | Age: 62
End: 2017-06-16
Attending: INTERNAL MEDICINE
Payer: MEDICAID

## 2017-06-16 DIAGNOSIS — N17.9 AKI (ACUTE KIDNEY INJURY): ICD-10-CM

## 2017-06-16 LAB
ANION GAP SERPL CALC-SCNC: 13 MMOL/L
BUN SERPL-MCNC: 52 MG/DL
CALCIUM SERPL-MCNC: 9.9 MG/DL
CHLORIDE SERPL-SCNC: 96 MMOL/L
CO2 SERPL-SCNC: 30 MMOL/L
CREAT SERPL-MCNC: 2.1 MG/DL
EST. GFR  (AFRICAN AMERICAN): 38.7 ML/MIN/1.73 M^2
EST. GFR  (NON AFRICAN AMERICAN): 33.4 ML/MIN/1.73 M^2
GLUCOSE SERPL-MCNC: 119 MG/DL
POTASSIUM SERPL-SCNC: 3.6 MMOL/L
SODIUM SERPL-SCNC: 139 MMOL/L

## 2017-06-16 PROCEDURE — 80048 BASIC METABOLIC PNL TOTAL CA: CPT

## 2017-06-16 PROCEDURE — 36415 COLL VENOUS BLD VENIPUNCTURE: CPT | Mod: PO

## 2017-06-18 NOTE — PROGRESS NOTES
Your lab results are slightly improved and the urine results showed that your decrease in kidney function is likely from not getting enough fluid to the kidneys.  Please be sure that you are getting enough water.  You may want to confirm with your cardiologist exactly how much water you can have each day based upon your heart squeeze.  Please continue your current medications and doses.  Please feel free to contact me with any questions or concerns.    Sincerely,  Cesar Elaine  [email or web address]

## 2017-07-10 DIAGNOSIS — E78.2 COMBINED HYPERLIPIDEMIA ASSOCIATED WITH TYPE 2 DIABETES MELLITUS: ICD-10-CM

## 2017-07-10 DIAGNOSIS — E11.69 COMBINED HYPERLIPIDEMIA ASSOCIATED WITH TYPE 2 DIABETES MELLITUS: ICD-10-CM

## 2017-07-11 RX ORDER — GLIPIZIDE 5 MG/1
5 TABLET ORAL EVERY MORNING
Qty: 30 TABLET | Refills: 0 | Status: SHIPPED | OUTPATIENT
Start: 2017-07-11 | End: 2017-08-07 | Stop reason: SDUPTHER

## 2017-07-21 ENCOUNTER — CLINICAL SUPPORT (OUTPATIENT)
Dept: URGENT CARE | Facility: CLINIC | Age: 62
End: 2017-07-21

## 2017-07-21 DIAGNOSIS — Z02.83 ENCOUNTER FOR DRUG SCREENING: Primary | ICD-10-CM

## 2017-07-21 PROCEDURE — 80305 DRUG TEST PRSMV DIR OPT OBS: CPT | Mod: S$GLB,,, | Performed by: PREVENTIVE MEDICINE

## 2017-07-28 ENCOUNTER — CLINICAL SUPPORT (OUTPATIENT)
Dept: OCCUPATIONAL MEDICINE | Facility: CLINIC | Age: 62
End: 2017-07-28

## 2017-07-28 DIAGNOSIS — Z02.1 ENCOUNTER FOR PRE-EMPLOYMENT EXAMINATION: Primary | ICD-10-CM

## 2017-07-28 LAB
BILIRUB UR QL STRIP: NORMAL
GLUCOSE UR QL STRIP: NORMAL
KETONES UR QL STRIP: NORMAL
LEUKOCYTE ESTERASE UR QL STRIP: NORMAL
PH, POC UA: NORMAL (ref 5–8)
POC BLOOD, URINE: NORMAL
POC NITRATES, URINE: NORMAL
PROT UR QL STRIP: NORMAL
SP GR UR STRIP: NORMAL (ref 1–1.03)
UROBILINOGEN UR STRIP-ACNC: NORMAL (ref 0.3–2.2)

## 2017-07-28 PROCEDURE — 99499 UNLISTED E&M SERVICE: CPT | Mod: S$GLB,,, | Performed by: NURSE PRACTITIONER

## 2017-08-07 DIAGNOSIS — E78.2 COMBINED HYPERLIPIDEMIA ASSOCIATED WITH TYPE 2 DIABETES MELLITUS: ICD-10-CM

## 2017-08-07 DIAGNOSIS — M10.9 ACUTE GOUT, UNSPECIFIED CAUSE, UNSPECIFIED SITE: ICD-10-CM

## 2017-08-07 DIAGNOSIS — E11.69 COMBINED HYPERLIPIDEMIA ASSOCIATED WITH TYPE 2 DIABETES MELLITUS: ICD-10-CM

## 2017-08-07 RX ORDER — GLIPIZIDE 5 MG/1
TABLET ORAL
Qty: 30 TABLET | Refills: 0 | Status: SHIPPED | OUTPATIENT
Start: 2017-08-07 | End: 2017-09-06 | Stop reason: SDUPTHER

## 2017-08-07 RX ORDER — AMIODARONE HYDROCHLORIDE 200 MG/1
TABLET ORAL
Qty: 60 TABLET | Refills: 3 | Status: SHIPPED | OUTPATIENT
Start: 2017-08-07

## 2017-08-07 RX ORDER — ALLOPURINOL 300 MG/1
TABLET ORAL
Qty: 90 TABLET | Refills: 3 | Status: SHIPPED | OUTPATIENT
Start: 2017-08-07

## 2017-09-06 DIAGNOSIS — E78.2 COMBINED HYPERLIPIDEMIA ASSOCIATED WITH TYPE 2 DIABETES MELLITUS: ICD-10-CM

## 2017-09-06 DIAGNOSIS — E11.69 COMBINED HYPERLIPIDEMIA ASSOCIATED WITH TYPE 2 DIABETES MELLITUS: ICD-10-CM

## 2017-09-06 RX ORDER — GLIPIZIDE 5 MG/1
TABLET ORAL
Qty: 30 TABLET | Refills: 0 | Status: SHIPPED | OUTPATIENT
Start: 2017-09-06 | End: 2017-10-06 | Stop reason: SDUPTHER

## 2017-09-09 DIAGNOSIS — J44.9 CHRONIC OBSTRUCTIVE PULMONARY DISEASE, UNSPECIFIED COPD TYPE: ICD-10-CM

## 2017-09-10 RX ORDER — ALBUTEROL SULFATE 90 UG/1
AEROSOL, METERED RESPIRATORY (INHALATION)
Qty: 18 INHALER | Refills: 5 | Status: SHIPPED | OUTPATIENT
Start: 2017-09-10

## 2017-09-18 ENCOUNTER — PATIENT MESSAGE (OUTPATIENT)
Dept: FAMILY MEDICINE | Facility: CLINIC | Age: 62
End: 2017-09-18

## 2017-10-06 DIAGNOSIS — E78.2 COMBINED HYPERLIPIDEMIA ASSOCIATED WITH TYPE 2 DIABETES MELLITUS: ICD-10-CM

## 2017-10-06 DIAGNOSIS — E11.69 COMBINED HYPERLIPIDEMIA ASSOCIATED WITH TYPE 2 DIABETES MELLITUS: ICD-10-CM

## 2017-10-06 RX ORDER — GLIPIZIDE 5 MG/1
TABLET ORAL
Qty: 30 TABLET | Refills: 5 | Status: SHIPPED | OUTPATIENT
Start: 2017-10-06 | End: 2018-04-12 | Stop reason: SDUPTHER

## 2017-10-18 ENCOUNTER — OFFICE VISIT (OUTPATIENT)
Dept: FAMILY MEDICINE | Facility: CLINIC | Age: 62
End: 2017-10-18
Payer: MEDICAID

## 2017-10-18 ENCOUNTER — HOSPITAL ENCOUNTER (OUTPATIENT)
Dept: RADIOLOGY | Facility: HOSPITAL | Age: 62
Discharge: HOME | End: 2017-10-18
Attending: NURSE PRACTITIONER
Payer: MEDICAID

## 2017-10-18 VITALS
DIASTOLIC BLOOD PRESSURE: 76 MMHG | WEIGHT: 213.75 LBS | HEIGHT: 67 IN | OXYGEN SATURATION: 97 % | TEMPERATURE: 98 F | BODY MASS INDEX: 33.55 KG/M2 | SYSTOLIC BLOOD PRESSURE: 108 MMHG | HEART RATE: 70 BPM

## 2017-10-18 DIAGNOSIS — G47.33 OSA ON CPAP: Chronic | ICD-10-CM

## 2017-10-18 DIAGNOSIS — I73.9 PVD (PERIPHERAL VASCULAR DISEASE): ICD-10-CM

## 2017-10-18 DIAGNOSIS — N18.30 CHRONIC KIDNEY DISEASE, STAGE III (MODERATE): ICD-10-CM

## 2017-10-18 DIAGNOSIS — I10 ESSENTIAL HYPERTENSION: Chronic | ICD-10-CM

## 2017-10-18 DIAGNOSIS — I73.9 PAD (PERIPHERAL ARTERY DISEASE): Chronic | ICD-10-CM

## 2017-10-18 DIAGNOSIS — R60.0 LOWER EXTREMITY EDEMA: Primary | ICD-10-CM

## 2017-10-18 DIAGNOSIS — E11.51 DIABETES MELLITUS WITH PERIPHERAL VASCULAR DISEASE: Chronic | ICD-10-CM

## 2017-10-18 DIAGNOSIS — I50.22 CHRONIC SYSTOLIC CHF (CONGESTIVE HEART FAILURE): Chronic | ICD-10-CM

## 2017-10-18 DIAGNOSIS — I25.83 CORONARY ARTERY DISEASE DUE TO LIPID RICH PLAQUE: Chronic | ICD-10-CM

## 2017-10-18 DIAGNOSIS — I25.10 CORONARY ARTERY DISEASE DUE TO LIPID RICH PLAQUE: Chronic | ICD-10-CM

## 2017-10-18 DIAGNOSIS — R06.01 ORTHOPNEA: ICD-10-CM

## 2017-10-18 DIAGNOSIS — I48.0 PAROXYSMAL ATRIAL FIBRILLATION: ICD-10-CM

## 2017-10-18 DIAGNOSIS — Z23 NEED FOR IMMUNIZATION AGAINST INFLUENZA: ICD-10-CM

## 2017-10-18 PROCEDURE — 99215 OFFICE O/P EST HI 40 MIN: CPT | Mod: PBBFAC,25,PO | Performed by: NURSE PRACTITIONER

## 2017-10-18 PROCEDURE — 99214 OFFICE O/P EST MOD 30 MIN: CPT | Mod: S$PBB,,, | Performed by: NURSE PRACTITIONER

## 2017-10-18 PROCEDURE — 93005 ELECTROCARDIOGRAM TRACING: CPT | Mod: PBBFAC,PO | Performed by: NURSE PRACTITIONER

## 2017-10-18 PROCEDURE — 90471 IMMUNIZATION ADMIN: CPT | Mod: PBBFAC,PO

## 2017-10-18 PROCEDURE — 99999 PR PBB SHADOW E&M-EST. PATIENT-LVL V: CPT | Mod: PBBFAC,,, | Performed by: NURSE PRACTITIONER

## 2017-10-18 PROCEDURE — 71020 XR CHEST PA AND LATERAL: CPT | Mod: 26,,, | Performed by: RADIOLOGY

## 2017-10-18 PROCEDURE — 93010 ELECTROCARDIOGRAM REPORT: CPT | Mod: ,,, | Performed by: INTERNAL MEDICINE

## 2017-10-18 PROCEDURE — 71020 XR CHEST PA AND LATERAL: CPT | Mod: TC,PO

## 2017-10-18 RX ORDER — LINACLOTIDE 290 UG/1
CAPSULE, GELATIN COATED ORAL
Refills: 0 | COMMUNITY
Start: 2017-08-17 | End: 2017-11-06 | Stop reason: ALTCHOICE

## 2017-10-18 NOTE — PROGRESS NOTES
History of Present Illness   Silvano Becker is a 62 y.o. man with medical history as listed below who presents today for evaluation of persistent worsening of bilateral lower extremity edema. Symptoms have been present for about two months. Edema progressively worsens throughout the day and resolves overnight with elevation. His orthopnea has also worsened. He is not wearing his CPAP, does not tolerate well. He denies associated chest pain, palpitations, dizziness, or other complains. He is followed by cardiology, next appointment in one month. He has no additional complaints and is otherwise healthy on today's visit.      Past Medical History:   Diagnosis Date    Angioedema     Anticoagulant long-term use     Atrial fibrillation     CHF (congestive heart failure)     COPD (chronic obstructive pulmonary disease)     Coronary artery disease     Diabetes mellitus     Encounter for blood transfusion     Gout     High fever     Hypertension     ICD (implantable cardioverter-defibrillator) in place     Malignant hyperthermia     PAD (peripheral artery disease)        Past Surgical History:   Procedure Laterality Date    ABDOMINAL SURGERY      APPENDECTOMY      CARDIAC DEFIBRILLATOR PLACEMENT      CARDIAC STENTS       X 1    gastric bleed      HEMORRHOID SURGERY      peripheral stents      right leg and right iliac according to patient       Social History     Social History    Marital status:      Spouse name: N/A    Number of children: N/A    Years of education: N/A     Social History Main Topics    Smoking status: Current Every Day Smoker     Packs/day: 1.50     Years: 40.00     Last attempt to quit: 10/13/2016    Smokeless tobacco: Never Used      Comment: recently quit smoking    Alcohol use 0.0 oz/week      Comment: occasional    Drug use: No    Sexual activity: Not Asked     Other Topics Concern    None     Social History Narrative    None       Family History   Problem  "Relation Age of Onset    Heart disease Mother        Review of Systems  Review of Systems   Respiratory: Positive for shortness of breath and wheezing.    Cardiovascular: Positive for orthopnea, leg swelling and PND. Negative for chest pain, palpitations and claudication.   Genitourinary: Positive for frequency.   Neurological: Positive for weakness. Negative for dizziness.     A complete review of systems was otherwise negative.    Physical Exam  /76 (BP Location: Left arm, Patient Position: Sitting, BP Method: Large (Manual))   Pulse 70   Temp 97.6 °F (36.4 °C) (Oral)   Ht 5' 7" (1.702 m)   Wt 97 kg (213 lb 11.8 oz)   SpO2 97%   BMI 33.48 kg/m²   General appearance: alert, appears stated age, cooperative and no distress  Neck: no JVD, supple, symmetrical, trachea midline and thyroid not enlarged, symmetric, no tenderness/mass/nodules  Lungs: clear to auscultation bilaterally  Heart: regular rate and rhythm, S1, S2 normal, no murmur, click, rub or gallop and heart tones distant  Extremities: edema 2+ pitting edema L>R  Pulses: diminished pulses to lower extremities  Skin: Skin color, texture, turgor normal. No rashes or lesions or discoloration to lower extremities with decreased hair growth  Neurologic: Grossly normal      Assessment/Plan  Lower extremity edema  EKG shows no change from previous EKG.  Will obtain baseline labs and chest x-ray.  Continue to increase Lasix dose for swelling as discussed with cardiologist.  Swelling likely related to CHG with PAD/PVD and complicated with chronic kidney disease.  May need referral to [facility].   No red flags on exam.  ER precautions discussed with patient.  Follow-up with cardiologist in one month as scheduled.  -     IN OFFICE EKG 12-LEAD (to Muse)  -     CBC auto differential; Future; Expected date: 10/18/2017  -     Brain natriuretic peptide; Future; Expected date: 10/18/2017  -     Comprehensive metabolic panel; Future; Expected date: " 10/18/2017    Orthopnea  As above.  Needs to resume CPAP, no interested at this time. Discussed importance.  -     IN OFFICE EKG 12-LEAD (to Muse)  -     X-Ray Chest PA And Lateral; Future; Expected date: 10/18/2017  -     CBC auto differential; Future; Expected date: 10/18/2017  -     Brain natriuretic peptide; Future; Expected date: 10/18/2017  -     Comprehensive metabolic panel; Future; Expected date: 10/18/2017    Chronic systolic CHF (congestive heart failure)  The current medical regimen is effective;  continue present plan and medications.  Increase Lasix dose with Torsemide.  -     IN OFFICE EKG 12-LEAD (to Muse)  -     X-Ray Chest PA And Lateral; Future; Expected date: 10/18/2017  -     CBC auto differential; Future; Expected date: 10/18/2017  -     Brain natriuretic peptide; Future; Expected date: 10/18/2017  -     Comprehensive metabolic panel; Future; Expected date: 10/18/2017    Paroxysmal atrial fibrillation  The current medical regimen is effective;  continue present plan and medications.  -     IN OFFICE EKG 12-LEAD (to Muse)  -     CBC auto differential; Future; Expected date: 10/18/2017  -     Brain natriuretic peptide; Future; Expected date: 10/18/2017    Chronic kidney disease, stage III (moderate)  Will assess for worsening, may need follow-up with nephrology.  -     Comprehensive metabolic panel; Future; Expected date: 10/18/2017    Essential hypertension  The current medical regimen is effective;  continue present plan and medications.  Blood pressure at goal today, no lows with increase in Lasix.  -     IN OFFICE EKG 12-LEAD (to Muse)  -     Brain natriuretic peptide; Future; Expected date: 10/18/2017  -     Comprehensive metabolic panel; Future; Expected date: 10/18/2017    PAD (peripheral artery disease)  The current medical regimen is effective;  continue present plan and medications.    PVD (peripheral vascular disease)  The current medical regimen is effective;  continue present plan  and medications.    Coronary artery disease due to lipid rich plaque  The current medical regimen is effective;  continue present plan and medications.  Anticoagulation therapy.  -     CBC auto differential; Future; Expected date: 10/18/2017    Diabetes mellitus with peripheral vascular disease  The current medical regimen is effective;  continue present plan and medications.  -     Comprehensive metabolic panel; Future; Expected date: 10/18/2017    JT on CPAP  Needs to resume CPAP use.    Need for immunization against influenza  Administered today.  -     Influenza - Quadrivalent (3 years & older) (PF)    Return in about 1 month (around 11/18/2017) for cardiology visit.

## 2017-10-18 NOTE — PATIENT INSTRUCTIONS
Chronic Kidney Disease (CKD)     The role of the kidneys is to remove waste products and extra water from the blood.  When the kidneys do not work as they should, waste products begin to build up in the blood. This is called chronic kidney disease (CKD). CKD means that you have kidney damage or a decrease in kidney function lasting at least 3 months. CKD allows extra water, waste, and toxins to build up in the body. This can eventually become life-threatening. You might need dialysis or a kidney transplant to stay alive. This most severe form is called end stage renal disease.  Diabetes is the leading causes of chronic renal failure. Other causes include high blood pressure, hardening of the arteries (atherosclerosis), lupus, inflammation of the blood vessels (vasculitis), and past viral or bacterial infections. Certain over-the-counter pain medicines can cause renal failure when taken often over a long period of time. These include aspirin, ibuprofen, and related anti-inflammatory medicines called NSAIDs (nonsteroidal anti-inflammatory drugs).  Home care  The following guidelines will help you care for yourself at home:  · If you have diabetes, talk with your healthcare provider about keeping your blood sugar under control. Ask if you need to make and changes to your diet, lifestyle, or medicines.  · If you have high blood pressure:  ¨ Take prescribed medicine to lower your blood pressure to the recommended goal of less than 130/80.  ¨ Start a regular exercise program that you enjoy. Check with your healthcare provider to be sure your planned exercise program is right for you.  ¨ Eat less salt (sodium). Your healthcare provider can tell you how much salt per day is safe for you.  · If you are overweight, talk with your healthcare provider about a weight loss plan.  · If you smoke, you must quit. Smoking makes kidney disease worse. Talk with your healthcare provider about ways to help you quit.  For more  information, visit the following links:  ¨ www.smokefree.gov/sites/default/files/pdf/clearing-the-air-accessible.pdf  ¨ www.smokefree.gov  ¨ www.cancer.org/healthy/stayawayfromtobacco/guidetoquittingsmoking/  · Most people with CKD need to follow a special diet.  Be sure you understand yours. In general, you will need to limit protein, salt, potassium, and phosphorus. You also need to limit how much fluid you drink.   · CKD is a risk factor for heart disease. Talk with your healthcare provider about any other risk factors you might have and what you can do to lessen them.  · Talk with your healthcare provider about any medicines you are taking to find out if they need to be reduced or stopped.  · Don't use the following over-the-counter medicines, or consult your healthcare provider before using:  ¨ Aspirin and NSAIDs such as ibuprofen or naproxen. Using acetaminophen for fever or pain is OK.  ¨ Laxatives and antacids containing magnesium or aluminum  ¨ Fleet or phospho soda enemas containing phosphorus  ¨ Certain stomach acid-blocking medicine such as cimetidine or ranitidine   ¨ Decongestants containing pseudoephedrine   ¨ Herbal supplements  Follow-up care  Follow up with your healthcare provider, or as advised. Contact one of the following for more information:  · American Association of Kidney Patients 024-082-1476 www.aakp.org  · National Kidney Foundation 879-139-0140 www.kidney.org  · American Kidney Fund 267-699-0202 www.kidneyfund.org  · National Kidney Disease Education Program 866-4KIDNEY www.nkdep.nih.gov  If an X-ray, ECG (cardiogram), or other diagnostic test was taken, you will be told of any new findings that may affect your care.  Call 911  Call 911 if you have any of the following:  · Severe weakness, dizziness, fainting, drowsiness, or confusion  · Chest pain or shortness of breath  · Heart beating fast, slow, or irregularly  When to seek medical advice  Call your healthcare provider right away  if any of these occur:  · Nausea or vomiting  · Fever of 100.4°F (38°C) or higher, or as directed by your healthcare provider  · Unexpected weight gain or swelling in the legs, ankles, or around the eyes  · Decrease or absent urine output  Date Last Reviewed: 9/1/2016  © 2458-8766 [employer]. [address] [place] [address], [place] [address]. All rights reserved. This information is not intended as a substitute for professional medical care. Always follow your healthcare professional's instructions.

## 2017-10-19 NOTE — PROGRESS NOTES
Patient tolerated Flu injection well. VIS given to patient. Patient instructed to wait 15 min before leaving. Patient verbalized understanding.

## 2017-10-20 ENCOUNTER — PATIENT MESSAGE (OUTPATIENT)
Dept: FAMILY MEDICINE | Facility: CLINIC | Age: 62
End: 2017-10-20

## 2017-11-03 ENCOUNTER — PATIENT MESSAGE (OUTPATIENT)
Dept: CARDIOLOGY | Facility: CLINIC | Age: 62
End: 2017-11-03

## 2017-11-06 ENCOUNTER — OFFICE VISIT (OUTPATIENT)
Dept: CARDIOLOGY | Facility: CLINIC | Age: 62
End: 2017-11-06
Payer: MEDICAID

## 2017-11-06 VITALS
DIASTOLIC BLOOD PRESSURE: 77 MMHG | SYSTOLIC BLOOD PRESSURE: 153 MMHG | OXYGEN SATURATION: 95 % | BODY MASS INDEX: 32.11 KG/M2 | HEIGHT: 67 IN | HEART RATE: 59 BPM | WEIGHT: 204.56 LBS

## 2017-11-06 DIAGNOSIS — Z72.0 TOBACCO ABUSE: ICD-10-CM

## 2017-11-06 DIAGNOSIS — I48.0 PAROXYSMAL ATRIAL FIBRILLATION: ICD-10-CM

## 2017-11-06 DIAGNOSIS — I25.83 CORONARY ARTERY DISEASE DUE TO LIPID RICH PLAQUE: Primary | ICD-10-CM

## 2017-11-06 DIAGNOSIS — E78.5 DYSLIPIDEMIA: ICD-10-CM

## 2017-11-06 DIAGNOSIS — E11.51 DIABETES MELLITUS WITH PERIPHERAL VASCULAR DISEASE: ICD-10-CM

## 2017-11-06 DIAGNOSIS — I50.22 CHRONIC SYSTOLIC CHF (CONGESTIVE HEART FAILURE): ICD-10-CM

## 2017-11-06 DIAGNOSIS — I10 ESSENTIAL HYPERTENSION: ICD-10-CM

## 2017-11-06 DIAGNOSIS — I73.9 PAD (PERIPHERAL ARTERY DISEASE): ICD-10-CM

## 2017-11-06 DIAGNOSIS — Z95.810 AICD (AUTOMATIC CARDIOVERTER/DEFIBRILLATOR) PRESENT: ICD-10-CM

## 2017-11-06 DIAGNOSIS — I25.10 CORONARY ARTERY DISEASE DUE TO LIPID RICH PLAQUE: Primary | ICD-10-CM

## 2017-11-06 PROCEDURE — 99214 OFFICE O/P EST MOD 30 MIN: CPT | Mod: S$PBB,,, | Performed by: INTERNAL MEDICINE

## 2017-11-06 PROCEDURE — 99213 OFFICE O/P EST LOW 20 MIN: CPT | Mod: PBBFAC | Performed by: INTERNAL MEDICINE

## 2017-11-06 PROCEDURE — 99999 PR PBB SHADOW E&M-EST. PATIENT-LVL III: CPT | Mod: PBBFAC,,, | Performed by: INTERNAL MEDICINE

## 2017-11-06 NOTE — PROGRESS NOTES
Subjective:    Patient ID:  Silvano Becker is a 62 y.o. male who presents for follow-up of Hospital Follow Up      Coronary Artery Disease   Pertinent negatives include no chest pain, dizziness, leg swelling, palpitations or shortness of breath.     Previous history:  Here for follow-up of CAD, systolic heart failure and peripheral vascular disease.  He's had as good days and bad days.  He complains of slow heart rate with occasional fast heart rates alternating.  He denies any dizziness, presyncope or syncope.  He does have baseline shortness of breath and does minimal activity.  He still trying to discontinue smoking is participating in the cessation clinic.  He denies any PND, orthopnea or lower edema.  He's been staying away from salt.  He does not exercise routinely.  His daughter was recently diagnosed with metastatic cervical cancer.    Today:  Here for follow-up of coronary artery disease, systolic heart failure and peripheral vascular disease.  He recently was admitted to [facility] for worsening volume overload.  He denies any current chest pain, shortness of breath or palpitations.  He's been smoking couple cigarettes a day.  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  He is not expressing PND, orthopnea or lower edema after discharge.  He denies any dizziness, presyncope or syncope.  Prior to the hospitalization he was having significant palpitations.  We discussed this was likely secondary to volume overload    Review of Systems   Constitution: Negative.   HENT: Negative.    Eyes: Negative.    Cardiovascular: Negative for chest pain, dyspnea on exertion, irregular heartbeat, leg swelling, near-syncope, orthopnea, palpitations, paroxysmal nocturnal dyspnea and syncope.   Respiratory: Negative for shortness of breath.    Skin: Negative.    Musculoskeletal: Negative.    Gastrointestinal: Negative for abdominal pain, constipation and diarrhea.   Genitourinary: Negative for dysuria.    Neurological: Positive for numbness and paresthesias. Negative for dizziness.   Psychiatric/Behavioral: Negative.         Objective:    Physical Exam   Constitutional: He is oriented to person, place, and time. He appears well-developed and well-nourished. No distress.   HENT:   Head: Normocephalic and atraumatic.   Eyes: Conjunctivae and EOM are normal. Pupils are equal, round, and reactive to light.   Neck: Normal range of motion. Neck supple. No thyromegaly present.   Cardiovascular: Normal rate, regular rhythm and normal heart sounds.    No murmur heard.  Pulmonary/Chest: Effort normal and breath sounds normal. No respiratory distress. He has no wheezes. He has no rales. He exhibits no tenderness.   Decreased coarse breath sounds bilaterally   Abdominal: Soft. Bowel sounds are normal.   Musculoskeletal: He exhibits no edema.   Neurological: He is alert and oriented to person, place, and time.   Skin: Skin is warm and dry.   Psychiatric: He has a normal mood and affect. His behavior is normal.           Echo:  CONCLUSIONS     1 - Moderately depressed left ventricular systolic function (EF 30-35%).     2 - Eccentric hypertrophy.     3 - Left ventricular diastolic dysfunction.     4 - Pulmonary hypertension. The estimated PA systolic pressure is 59 mmHg.     5 - Severe left atrial enlargement.     6 - Trivial pericardial effusion.     Lower extremity arterial ultrasound:   Abnormal waveforms are seen throughout both lower extremities. The patient does have a history of stents which will cause abnormalities in the waveforms. This and some element of peripheral vascular disease is  thought to account for the abnormal waveforms. Further assessment may be obtained with MARGARITA.    Assessment:       1. Coronary artery disease due to lipid rich plaque    2. PAD (peripheral artery disease)    3. Chronic systolic CHF (congestive heart failure)    4. AICD (automatic cardioverter/defibrillator) present    5. Essential  hypertension    6. Dyslipidemia    7. Paroxysmal atrial fibrillation    8. Tobacco abuse    9. Diabetes mellitus with peripheral vascular disease         Plan:       -cont med tx  -counseled on tobacco, referred to clinic  -Encouraged diet and exercise  -Trial of gabapentin for possible neuropathic pain  -MARGARITA post revascularization next visit  -Only on ASA/Plavix for oral anticoagulation with prior history of GI bleed (*aware of the risks/benefits off OAC)    Clinic as scheduled with device check

## 2017-11-08 RX ORDER — FLUTICASONE FUROATE AND VILANTEROL 200; 25 UG/1; UG/1
1 POWDER RESPIRATORY (INHALATION) DAILY
Qty: 30 EACH | Refills: 11 | Status: SHIPPED | OUTPATIENT
Start: 2017-11-08

## 2017-11-13 ENCOUNTER — PATIENT MESSAGE (OUTPATIENT)
Dept: FAMILY MEDICINE | Facility: CLINIC | Age: 62
End: 2017-11-13

## 2017-11-13 DIAGNOSIS — F41.9 ANXIETY: ICD-10-CM

## 2017-11-16 PROBLEM — F41.9 ANXIETY: Status: ACTIVE | Noted: 2017-11-16

## 2017-11-16 RX ORDER — SERTRALINE HYDROCHLORIDE 50 MG/1
TABLET, FILM COATED ORAL
Qty: 30 TABLET | Refills: 1 | Status: SHIPPED | OUTPATIENT
Start: 2017-11-16 | End: 2018-01-11 | Stop reason: SDUPTHER

## 2017-11-16 NOTE — TELEPHONE ENCOUNTER
Patient scheduled for 12/13/17 @ 11 am with BEL Boggs. No further questions or concerns at this time.

## 2017-11-16 NOTE — TELEPHONE ENCOUNTER
Please call the patient to schedule f/u anxiety with Carolyn in about a month.  He was started on sertraline and needs follow up to monitor how he is doing on this dose    Thank you,  Cash

## 2017-12-22 RX ORDER — CLOPIDOGREL BISULFATE 75 MG/1
TABLET ORAL
Qty: 30 TABLET | Refills: 11 | Status: SHIPPED | OUTPATIENT
Start: 2017-12-22

## 2017-12-22 RX ORDER — ATORVASTATIN CALCIUM 80 MG/1
TABLET, FILM COATED ORAL
Qty: 30 TABLET | Refills: 11 | Status: SHIPPED | OUTPATIENT
Start: 2017-12-22

## 2017-12-22 RX ORDER — AMLODIPINE BESYLATE 5 MG/1
TABLET ORAL
Qty: 30 TABLET | Refills: 6 | Status: SHIPPED | OUTPATIENT
Start: 2017-12-22

## 2017-12-22 RX ORDER — CARVEDILOL 25 MG/1
TABLET ORAL
Qty: 180 TABLET | Refills: 3 | Status: SHIPPED | OUTPATIENT
Start: 2017-12-22

## 2017-12-28 DIAGNOSIS — E11.9 TYPE 2 DIABETES MELLITUS WITHOUT COMPLICATION: ICD-10-CM

## 2018-01-03 DIAGNOSIS — J44.9 CHRONIC OBSTRUCTIVE PULMONARY DISEASE, UNSPECIFIED COPD TYPE: Chronic | ICD-10-CM

## 2018-01-04 DIAGNOSIS — K21.9 GASTROESOPHAGEAL REFLUX DISEASE, ESOPHAGITIS PRESENCE NOT SPECIFIED: ICD-10-CM

## 2018-01-04 RX ORDER — SPIRONOLACTONE 25 MG/1
TABLET ORAL
Qty: 90 TABLET | Refills: 3 | Status: SHIPPED | OUTPATIENT
Start: 2018-01-04

## 2018-01-04 RX ORDER — TIOTROPIUM BROMIDE INHALATION SPRAY 3.12 UG/1
SPRAY, METERED RESPIRATORY (INHALATION)
Qty: 4 G | Refills: 6 | Status: SHIPPED | OUTPATIENT
Start: 2018-01-04

## 2018-01-04 RX ORDER — PANTOPRAZOLE SODIUM 40 MG/1
TABLET, DELAYED RELEASE ORAL
Qty: 30 TABLET | Refills: 6 | Status: SHIPPED | OUTPATIENT
Start: 2018-01-04

## 2018-01-11 ENCOUNTER — OFFICE VISIT (OUTPATIENT)
Dept: FAMILY MEDICINE | Facility: CLINIC | Age: 63
End: 2018-01-11
Payer: MEDICAID

## 2018-01-11 VITALS
WEIGHT: 201.25 LBS | TEMPERATURE: 98 F | BODY MASS INDEX: 31.59 KG/M2 | HEART RATE: 90 BPM | HEIGHT: 67 IN | SYSTOLIC BLOOD PRESSURE: 140 MMHG | DIASTOLIC BLOOD PRESSURE: 84 MMHG | OXYGEN SATURATION: 94 %

## 2018-01-11 DIAGNOSIS — J44.1 CHRONIC OBSTRUCTIVE PULMONARY DISEASE WITH ACUTE EXACERBATION: Chronic | ICD-10-CM

## 2018-01-11 DIAGNOSIS — N18.30 CKD (CHRONIC KIDNEY DISEASE), STAGE III: ICD-10-CM

## 2018-01-11 DIAGNOSIS — F17.200 TOBACCO DEPENDENCE: Chronic | ICD-10-CM

## 2018-01-11 DIAGNOSIS — I50.42 CHRONIC COMBINED SYSTOLIC AND DIASTOLIC HEART FAILURE: Chronic | ICD-10-CM

## 2018-01-11 DIAGNOSIS — F41.9 ANXIETY: ICD-10-CM

## 2018-01-11 DIAGNOSIS — Z09 HOSPITAL DISCHARGE FOLLOW-UP: Primary | ICD-10-CM

## 2018-01-11 DIAGNOSIS — E11.51 DIABETES MELLITUS WITH PERIPHERAL VASCULAR DISEASE: Chronic | ICD-10-CM

## 2018-01-11 DIAGNOSIS — I10 ESSENTIAL HYPERTENSION: Chronic | ICD-10-CM

## 2018-01-11 PROCEDURE — 99213 OFFICE O/P EST LOW 20 MIN: CPT | Mod: PBBFAC,PO | Performed by: INTERNAL MEDICINE

## 2018-01-11 PROCEDURE — 94640 AIRWAY INHALATION TREATMENT: CPT | Mod: PBBFAC,PO | Performed by: INTERNAL MEDICINE

## 2018-01-11 PROCEDURE — 99999 PR PBB SHADOW E&M-EST. PATIENT-LVL III: CPT | Mod: PBBFAC,,, | Performed by: INTERNAL MEDICINE

## 2018-01-11 PROCEDURE — 94640 AIRWAY INHALATION TREATMENT: CPT | Mod: PBBFAC,PO

## 2018-01-11 PROCEDURE — 99214 OFFICE O/P EST MOD 30 MIN: CPT | Mod: S$PBB,,, | Performed by: INTERNAL MEDICINE

## 2018-01-11 RX ORDER — ISOSORBIDE DINITRATE 20 MG/1
20 TABLET ORAL 3 TIMES DAILY
Refills: 0 | COMMUNITY
Start: 2018-01-04

## 2018-01-11 RX ORDER — SERTRALINE HYDROCHLORIDE 100 MG/1
100 TABLET, FILM COATED ORAL DAILY
Qty: 90 TABLET | Refills: 0 | Status: SHIPPED | OUTPATIENT
Start: 2018-01-11 | End: 2018-04-07 | Stop reason: SDUPTHER

## 2018-01-11 RX ORDER — IPRATROPIUM BROMIDE AND ALBUTEROL SULFATE 2.5; .5 MG/3ML; MG/3ML
3 SOLUTION RESPIRATORY (INHALATION)
Status: COMPLETED | OUTPATIENT
Start: 2018-01-11 | End: 2018-01-11

## 2018-01-11 RX ORDER — PREDNISONE 20 MG/1
60 TABLET ORAL DAILY
Qty: 15 TABLET | Refills: 0 | Status: SHIPPED | OUTPATIENT
Start: 2018-01-11 | End: 2018-01-16

## 2018-01-11 RX ORDER — HYDRALAZINE HYDROCHLORIDE 25 MG/1
TABLET, FILM COATED ORAL
Refills: 0 | COMMUNITY
Start: 2018-01-04

## 2018-01-11 RX ADMIN — IPRATROPIUM BROMIDE AND ALBUTEROL SULFATE 3 ML: .5; 2.5 SOLUTION RESPIRATORY (INHALATION) at 01:01

## 2018-01-11 NOTE — ASSESSMENT & PLAN NOTE
The current medical regimen is effective;  continue present plan and medications. Will be changing to [facility] cardiology.  Next OV in ~ 1 month per patient.

## 2018-01-11 NOTE — PROGRESS NOTES
Assessment & Plan  Problem List Items Addressed This Visit        Psychiatric    Anxiety    Current Assessment & Plan     Will increase sertraline.  Counseled on low cardiac risk.  Also discussed that counseling is likely to be more effective than a medication increase but he would like to try this first.         Relevant Medications    sertraline (ZOLOFT) 100 MG tablet       Pulmonary    COPD (chronic obstructive pulmonary disease) (Chronic)    Current Assessment & Plan     Now followed by [facility] pulm.  PFTs planned.  Will continue current baseline medications.  Will add PO steroids for acute flare.  I have discussed the common side effects of this medication and black box warnings (if applicable to this medication) with the patient and answered all of the questions they had at the time of this visit regarding this medication.  If worsening or fever then will add antibiotic coverage.          Relevant Medications    albuterol-ipratropium 2.5mg-0.5mg/3mL nebulizer solution 3 mL (Completed)    predniSONE (DELTASONE) 20 MG tablet       Cardiac/Vascular    HTN (hypertension) (Chronic)    Current Assessment & Plan     Increase likely due to COPD exacerbation.  Monitor          Relevant Orders    Lipid panel    Chronic combined systolic and diastolic heart failure (Chronic)    Overview     Dr. Warren         Current Assessment & Plan     The current medical regimen is effective;  continue present plan and medications. Will be changing to [facility] cardiology.  Next OV in ~ 1 month per patient.             Renal/    CKD (chronic kidney disease), stage III (Chronic)    Current Assessment & Plan     Recent acute exacerbation will recheck at follow up         Relevant Orders    Comprehensive metabolic panel       Endocrine    Diabetes mellitus with peripheral vascular disease (Chronic)    Current Assessment & Plan     Recheck labs at follow up         Relevant Orders    Hemoglobin A1c       Other    Tobacco dependence (Chronic)     Current Assessment & Plan     Pt reports that he and his wife have now stopped smoking.  His children still smoke in front of him.  Counseled that he should avoid secondhand smoke as well.            Other Visit Diagnoses     Hospital discharge follow-up    -  Primary  -    Seems to be doing well from hospital discharge follow up standpoint.  Has OV with pulm and cards in the near future.  Working on recent COPD exacerbation as above.  Requesting hospital discharge summary.             Health Maintenance reviewed, deferred.    Follow-up: Return in about 2 months (around 3/11/2018).    ______________________________________________________________________    Chief Complaint  Chief Complaint   Patient presents with    Hospital Follow Up       HPI  Silvano Becker is a 62 y.o. male with multiple medical diagnoses as listed in the medical history and problem list that presents for hospital follow up.  Pt is known to me with his last appointment 10/18/2017.      Reports that he has been hospitalized x2 at [facility] since I have last seen him with the most recent being Dec 1st for CHF exacerbation.  He had to be placed into the ICU and reports that he was nearly intubated.  Received NIPPV and IV diuresis.  He has labs from the hospital that showed a Cr of 1.83 up from baseline of 1.5.  Having severe RODRIGUEZ at this point. He has had follow up with pulmonology Dr. Askew and cardiology Dr. Brenner at [facility].  PSG has been ordered and medications have been adjusted and we have updated his med list accordingly.  Denies any persistent weight gain.  Reports that his RODRIGUEZ at this time is with ADLs.  He is cough constantly.  Needing his albuterol nebs at least Q4.        PAST MEDICAL HISTORY:  Past Medical History:   Diagnosis Date    Angioedema     Anticoagulant long-term use     Atrial fibrillation     CHF (congestive heart failure)     COPD (chronic obstructive pulmonary disease)     Coronary artery disease     Diabetes mellitus      Encounter for blood transfusion     Gout     High fever     Hypertension     ICD (implantable cardioverter-defibrillator) in place     Malignant hyperthermia     PAD (peripheral artery disease)        PAST SURGICAL HISTORY:  Past Surgical History:   Procedure Laterality Date    ABDOMINAL SURGERY      APPENDECTOMY      CARDIAC DEFIBRILLATOR PLACEMENT      CARDIAC STENTS       X 1    gastric bleed      HEMORRHOID SURGERY      peripheral stents      right leg and right iliac according to patient       SOCIAL HISTORY:  Social History     Social History    Marital status:      Spouse name: N/A    Number of children: N/A    Years of education: N/A     Occupational History    Not on file.     Social History Main Topics    Smoking status: Current Every Day Smoker     Packs/day: 1.50     Years: 40.00     Last attempt to quit: 10/13/2016    Smokeless tobacco: Never Used      Comment: recently quit smoking    Alcohol use 0.0 oz/week      Comment: occasional    Drug use: No    Sexual activity: Not on file     Other Topics Concern    Not on file     Social History Narrative    No narrative on file       FAMILY HISTORY:  Family History   Problem Relation Age of Onset    Heart disease Mother        ALLERGIES AND MEDICATIONS: updated and reviewed.  Review of patient's allergies indicates:   Allergen Reactions    Ace inhibitors Edema     Angioedema treated at [facility] in ICU no intubation. ALL ACE Inhibitors.    Arb-angiotensin receptor antagonist Swelling     Angioedema while on ACE-I    Lisinopril Swelling     Current Outpatient Prescriptions   Medication Sig Dispense Refill    ACCU-CHEK FRANK Misc   1    atorvastatin (LIPITOR) 80 MG tablet take 1 tablet by mouth every evening 30 tablet 11    blood sugar diagnostic Strp accu-chek frank smartview meter 100 each 11    carvedilol (COREG) 25 MG tablet take 1 tablet by mouth twice a day 180 tablet 3    clopidogrel (PLAVIX) 75 mg tablet take 1  tablet by mouth once daily 30 tablet 11    fluticasone-vilanterol (BREO) 200-25 mcg/dose DsDv diskus inhaler Inhale 1 puff into the lungs once daily. 30 each 11    furosemide (LASIX) 40 MG tablet Take 1 tablet (40 mg total) by mouth 2 (two) times daily. 90 tablet 3    gabapentin (NEURONTIN) 600 MG tablet take 2 tablets by mouth three times a day 180 tablet 11    glipiZIDE (GLUCOTROL) 5 MG tablet take 1 tablet by mouth every morning 30 tablet 5    hydrALAZINE (APRESOLINE) 25 MG tablet take 1 tablet by mouth three times a day with food  0    isosorbide dinitrate (ISORDIL) 20 MG tablet Take 20 mg by mouth 3 (three) times daily.  0    nitroGLYCERIN (NITROSTAT) 0.4 MG SL tablet Place 1 tablet (0.4 mg total) under the tongue every 5 (five) minutes as needed for Chest pain. 60 tablet 3    pantoprazole (PROTONIX) 40 MG tablet take 1 tablet by mouth once daily 30 tablet 6    SPIRIVA RESPIMAT 2.5 mcg/actuation Mist inhale 2 PUFFS INTO LUNGS ONCE A DAY. THIS IS A CONTROLLER MEDICATION AND SHOULD BE TAKEN EVERY DAY 4 g 6    VENTOLIN HFA 90 mcg/actuation inhaler inhale 1 to 2 puffs by mouth every 4 hours if needed 18 Inhaler 5    albuterol (PROAIR HFA) 90 mcg/actuation inhaler Inhale 1-2 puffs into the lungs every 4 (four) hours as needed for Wheezing. 18 g 5    allopurinol (ZYLOPRIM) 300 MG tablet take 1 tablet by mouth once daily 90 tablet 3    amiodarone (PACERONE) 200 MG Tab take 1 tablet by mouth every morning 60 tablet 3    amLODIPine (NORVASC) 5 MG tablet take 1 tablet by mouth once daily 30 tablet 6    cloNIDine (CATAPRES) 0.1 MG tablet Take 0.1 mg by mouth once daily.  0    colchicine 0.6 mg tablet Take 0.6 mg by mouth daily as needed.   0    hydrALAZINE (APRESOLINE) 10 MG tablet   0    isosorbide mononitrate (IMDUR) 30 MG 24 hr tablet Take 30 mg by mouth every morning.  0    linaclotide 72 mcg Cap Take 1 capsule (72 mcg total) by mouth once daily. 90 capsule 3    metOLazone (ZAROXOLYN) 2.5 MG tablet  "  0    predniSONE (DELTASONE) 20 MG tablet Take 3 tablets (60 mg total) by mouth once daily. 15 tablet 0    sertraline (ZOLOFT) 100 MG tablet Take 1 tablet (100 mg total) by mouth once daily. 90 tablet 0    spironolactone (ALDACTONE) 25 MG tablet Take 1 tablet (25 mg total) by mouth once daily. 90 tablet 3    spironolactone (ALDACTONE) 25 MG tablet take 1 tablet by mouth once daily 90 tablet 3    torsemide (DEMADEX) 20 MG Tab   0     No current facility-administered medications for this visit.          ROS  Review of Systems   Constitutional: Negative for chills, fever and unexpected weight change.   HENT: Negative for congestion, dental problem, ear pain, hearing loss, rhinorrhea, sore throat and trouble swallowing.    Eyes: Negative for discharge, redness and visual disturbance.   Respiratory: Positive for cough, chest tightness, shortness of breath and wheezing.    Cardiovascular: Negative for chest pain, palpitations and leg swelling.   Gastrointestinal: Negative for abdominal pain, constipation, diarrhea, nausea and vomiting.   Endocrine: Negative for polydipsia, polyphagia and polyuria.   Genitourinary: Negative for decreased urine volume, dysuria and hematuria.   Musculoskeletal: Negative for arthralgias and myalgias.   Skin: Negative for color change and rash.   Neurological: Negative for dizziness, weakness, light-headedness and headaches.   Psychiatric/Behavioral: Negative for decreased concentration, dysphoric mood, sleep disturbance and suicidal ideas.           Physical Exam  Vitals:    01/11/18 1257 01/11/18 1405   BP: (!) 160/78 (!) 140/84   Pulse: 90    Temp: 98.3 °F (36.8 °C)    SpO2: (!) 94%    Weight: 91.3 kg (201 lb 4.5 oz)    Height: 5' 7" (1.702 m)     Body mass index is 31.52 kg/m².  Weight: 91.3 kg (201 lb 4.5 oz)   Height: 5' 7" (170.2 cm)   Physical Exam   Constitutional: He is oriented to person, place, and time. He appears well-developed and well-nourished. No distress.   HENT: "   Head: Normocephalic and atraumatic.   Eyes: Conjunctivae, EOM and lids are normal. Pupils are equal, round, and reactive to light. No scleral icterus.   Neck: Full passive range of motion without pain. Neck supple. No JVD present. Carotid bruit is not present. No thyromegaly present.   Cardiovascular: Normal rate, regular rhythm, normal heart sounds and intact distal pulses.  Exam reveals no S3, no S4 and no friction rub.    No murmur heard.  Pulmonary/Chest: He is in respiratory distress (can barely speak due to excessive coughing). He has decreased breath sounds (bases). He has wheezes (coarse). He has no rhonchi. He has rales.   Post Duoneb:  WOB improved and in less distress.  + expiratory wheezing in the bases.  Otherwise much improved.  No longer coughing.    Abdominal: Soft. Bowel sounds are normal. There is no tenderness.   Musculoskeletal: He exhibits no edema or tenderness.   Lymphadenopathy:        Head (right side): No submental and no submandibular adenopathy present.        Head (left side): No submental and no submandibular adenopathy present.     He has no cervical adenopathy.        Right: No supraclavicular adenopathy present.        Left: No supraclavicular adenopathy present.   Neurological: He is alert and oriented to person, place, and time.   Motor grossly intact.  Sensory grossly intact.  Symmetric facial movements palate elevated symmetrically tongue midline   Skin: Skin is warm and dry. No rash noted.   Psychiatric: He has a normal mood and affect. His speech is normal and behavior is normal. Thought content normal.         Health Maintenance       Date Due Completion Date    TETANUS VACCINE 08/27/1975 ---    Zoster Vaccine 08/27/2017 ---    Hemoglobin A1c 12/09/2017 6/9/2017    Foot Exam 06/05/2018 6/5/2017    Lipid Panel 06/09/2018 6/9/2017    Eye Exam 06/09/2018 6/9/2017    Urine Microalbumin 06/09/2018 6/9/2017    Pneumococcal PPSV23 (Medium Risk) (2) 08/27/2022 11/25/2013     Colonoscopy 04/12/2023 4/12/2013 (Done)    Override on 4/12/2013: Done (polyp)

## 2018-01-12 NOTE — ASSESSMENT & PLAN NOTE
Now followed by [facility] pulm.  PFTs planned.  Will continue current baseline medications.  Will add PO steroids for acute flare.  I have discussed the common side effects of this medication and black box warnings (if applicable to this medication) with the patient and answered all of the questions they had at the time of this visit regarding this medication.  If worsening or fever then will add antibiotic coverage.

## 2018-01-12 NOTE — ASSESSMENT & PLAN NOTE
Pt reports that he and his wife have now stopped smoking.  His children still smoke in front of him.  Counseled that he should avoid secondhand smoke as well.

## 2018-01-12 NOTE — ASSESSMENT & PLAN NOTE
Will increase sertraline.  Counseled on low cardiac risk.  Also discussed that counseling is likely to be more effective than a medication increase but he would like to try this first.

## 2018-01-20 DIAGNOSIS — G62.9 PERIPHERAL POLYNEUROPATHY: ICD-10-CM

## 2018-01-22 RX ORDER — GABAPENTIN 600 MG/1
TABLET ORAL
Qty: 180 TABLET | Refills: 11 | Status: SHIPPED | OUTPATIENT
Start: 2018-01-22

## 2018-01-25 RX ORDER — FUROSEMIDE 40 MG/1
40 TABLET ORAL 2 TIMES DAILY
Qty: 90 TABLET | Refills: 3 | Status: SHIPPED | OUTPATIENT
Start: 2018-01-25

## 2018-01-29 ENCOUNTER — PATIENT MESSAGE (OUTPATIENT)
Dept: FAMILY MEDICINE | Facility: CLINIC | Age: 63
End: 2018-01-29

## 2018-03-21 ENCOUNTER — OFFICE VISIT (OUTPATIENT)
Dept: FAMILY MEDICINE | Facility: CLINIC | Age: 63
End: 2018-03-21
Payer: MEDICAID

## 2018-03-21 ENCOUNTER — TELEPHONE (OUTPATIENT)
Dept: SMOKING CESSATION | Facility: CLINIC | Age: 63
End: 2018-03-21

## 2018-03-21 VITALS
HEIGHT: 67 IN | TEMPERATURE: 98 F | SYSTOLIC BLOOD PRESSURE: 110 MMHG | HEART RATE: 96 BPM | DIASTOLIC BLOOD PRESSURE: 66 MMHG | WEIGHT: 221 LBS | OXYGEN SATURATION: 97 % | BODY MASS INDEX: 34.69 KG/M2

## 2018-03-21 DIAGNOSIS — Z09 HOSPITAL DISCHARGE FOLLOW-UP: Primary | ICD-10-CM

## 2018-03-21 DIAGNOSIS — G47.33 OSA ON CPAP: Chronic | ICD-10-CM

## 2018-03-21 DIAGNOSIS — E11.51 DIABETES MELLITUS WITH PERIPHERAL VASCULAR DISEASE: Chronic | ICD-10-CM

## 2018-03-21 DIAGNOSIS — J30.9 CHRONIC ALLERGIC RHINITIS, UNSPECIFIED SEASONALITY, UNSPECIFIED TRIGGER: ICD-10-CM

## 2018-03-21 DIAGNOSIS — S00.81XA EXCORIATION OF FACE, INITIAL ENCOUNTER: ICD-10-CM

## 2018-03-21 DIAGNOSIS — I10 ESSENTIAL HYPERTENSION: Chronic | ICD-10-CM

## 2018-03-21 PROCEDURE — 99213 OFFICE O/P EST LOW 20 MIN: CPT | Mod: PBBFAC,PO | Performed by: INTERNAL MEDICINE

## 2018-03-21 PROCEDURE — 99214 OFFICE O/P EST MOD 30 MIN: CPT | Mod: S$PBB,,, | Performed by: INTERNAL MEDICINE

## 2018-03-21 PROCEDURE — 99999 PR PBB SHADOW E&M-EST. PATIENT-LVL III: CPT | Mod: PBBFAC,,, | Performed by: INTERNAL MEDICINE

## 2018-03-21 RX ORDER — MONTELUKAST SODIUM 10 MG/1
10 TABLET ORAL NIGHTLY PRN
Qty: 30 TABLET | Refills: 11 | Status: SHIPPED | OUTPATIENT
Start: 2018-03-21 | End: 2018-04-20

## 2018-03-21 RX ORDER — MUPIROCIN 20 MG/G
OINTMENT TOPICAL 3 TIMES DAILY
Qty: 30 G | Refills: 1 | Status: SHIPPED | OUTPATIENT
Start: 2018-03-21

## 2018-03-21 NOTE — PROGRESS NOTES
Assessment & Plan  Problem List Items Addressed This Visit     None            Health Maintenance reviewed, ***.    Follow-up: No Follow-up on file.    ______________________________________________________________________    Chief Complaint  Chief Complaint   Patient presents with    Diabetes    Hypertension       HPI  Silvano Becker is a 62 y.o. male with multiple medical diagnoses as listed in the medical history and problem list that presents for HTN DM HLD follow up.  Pt is known to me with his last appointment 1/11/2018.      ***      PAST MEDICAL HISTORY:  Past Medical History:   Diagnosis Date    Angioedema     Anticoagulant long-term use     Atrial fibrillation     CHF (congestive heart failure)     COPD (chronic obstructive pulmonary disease)     Coronary artery disease     Diabetes mellitus     Encounter for blood transfusion     Gout     High fever     Hypertension     ICD (implantable cardioverter-defibrillator) in place     Malignant hyperthermia     PAD (peripheral artery disease)        PAST SURGICAL HISTORY:  Past Surgical History:   Procedure Laterality Date    ABDOMINAL SURGERY      APPENDECTOMY      CARDIAC DEFIBRILLATOR PLACEMENT      CARDIAC STENTS       X 1    gastric bleed      HEMORRHOID SURGERY      peripheral stents      right leg and right iliac according to patient       SOCIAL HISTORY:  Social History     Social History    Marital status:      Spouse name: N/A    Number of children: N/A    Years of education: N/A     Occupational History    Not on file.     Social History Main Topics    Smoking status: Current Every Day Smoker     Packs/day: 1.50     Years: 40.00     Last attempt to quit: 10/13/2016    Smokeless tobacco: Never Used      Comment: recently quit smoking    Alcohol use 0.0 oz/week      Comment: occasional    Drug use: No    Sexual activity: Not on file     Other Topics Concern    Not on file     Social History Narrative    No  narrative on file       FAMILY HISTORY:  Family History   Problem Relation Age of Onset    Heart disease Mother        ALLERGIES AND MEDICATIONS: updated and reviewed.  Review of patient's allergies indicates:   Allergen Reactions    Ace inhibitors Edema     Angioedema treated at [facility] in ICU no intubation. ALL ACE Inhibitors.    Arb-angiotensin receptor antagonist Swelling     Angioedema while on ACE-I    Lisinopril Swelling     Current Outpatient Prescriptions   Medication Sig Dispense Refill    ACCU-CHEK FRANK Misc   1    albuterol (PROAIR HFA) 90 mcg/actuation inhaler Inhale 1-2 puffs into the lungs every 4 (four) hours as needed for Wheezing. 18 g 5    allopurinol (ZYLOPRIM) 300 MG tablet take 1 tablet by mouth once daily 90 tablet 3    amiodarone (PACERONE) 200 MG Tab take 1 tablet by mouth every morning 60 tablet 3    amLODIPine (NORVASC) 5 MG tablet take 1 tablet by mouth once daily 30 tablet 6    atorvastatin (LIPITOR) 80 MG tablet take 1 tablet by mouth every evening 30 tablet 11    blood sugar diagnostic Strp accu-chek frank smartview meter 100 each 11    carvedilol (COREG) 25 MG tablet take 1 tablet by mouth twice a day 180 tablet 3    cloNIDine (CATAPRES) 0.1 MG tablet Take 0.1 mg by mouth once daily.  0    clopidogrel (PLAVIX) 75 mg tablet take 1 tablet by mouth once daily 30 tablet 11    colchicine 0.6 mg tablet Take 0.6 mg by mouth daily as needed.   0    fluticasone-vilanterol (BREO) 200-25 mcg/dose DsDv diskus inhaler Inhale 1 puff into the lungs once daily. 30 each 11    furosemide (LASIX) 40 MG tablet Take 1 tablet (40 mg total) by mouth 2 (two) times daily. 90 tablet 3    gabapentin (NEURONTIN) 600 MG tablet take 2 tablets by mouth three times a day 180 tablet 11    glipiZIDE (GLUCOTROL) 5 MG tablet take 1 tablet by mouth every morning 30 tablet 5    hydrALAZINE (APRESOLINE) 10 MG tablet   0    hydrALAZINE (APRESOLINE) 25 MG tablet take 1 tablet by mouth three times a day with  "food  0    isosorbide dinitrate (ISORDIL) 20 MG tablet Take 20 mg by mouth 3 (three) times daily.  0    isosorbide mononitrate (IMDUR) 30 MG 24 hr tablet Take 30 mg by mouth every morning.  0    linaclotide 72 mcg Cap Take 1 capsule (72 mcg total) by mouth once daily. 90 capsule 3    metOLazone (ZAROXOLYN) 2.5 MG tablet   0    nitroGLYCERIN (NITROSTAT) 0.4 MG SL tablet Place 1 tablet (0.4 mg total) under the tongue every 5 (five) minutes as needed for Chest pain. 60 tablet 3    pantoprazole (PROTONIX) 40 MG tablet take 1 tablet by mouth once daily 30 tablet 6    sertraline (ZOLOFT) 100 MG tablet Take 1 tablet (100 mg total) by mouth once daily. 90 tablet 0    SPIRIVA RESPIMAT 2.5 mcg/actuation Mist inhale 2 PUFFS INTO LUNGS ONCE A DAY. THIS IS A CONTROLLER MEDICATION AND SHOULD BE TAKEN EVERY DAY 4 g 6    spironolactone (ALDACTONE) 25 MG tablet Take 1 tablet (25 mg total) by mouth once daily. 90 tablet 3    spironolactone (ALDACTONE) 25 MG tablet take 1 tablet by mouth once daily 90 tablet 3    torsemide (DEMADEX) 20 MG Tab   0    VENTOLIN HFA 90 mcg/actuation inhaler inhale 1 to 2 puffs by mouth every 4 hours if needed 18 Inhaler 5     No current facility-administered medications for this visit.          ROS  Review of Systems        Physical Exam  Vitals:    03/21/18 1440   BP: (!) 140/70   Pulse: 96   Temp: 98.3 °F (36.8 °C)   SpO2: 97%   Weight: 100.2 kg (221 lb)   Height: 5' 7" (1.702 m)    Body mass index is 34.61 kg/m².  Weight: 100.2 kg (221 lb)   Height: 5' 7" (170.2 cm)   Physical Exam      Health Maintenance       Date Due Completion Date    TETANUS VACCINE 08/27/1975 ---    Zoster Vaccine 08/27/2017 ---    Hemoglobin A1c 12/09/2017 6/9/2017    Foot Exam 06/05/2018 6/5/2017    Lipid Panel 06/09/2018 6/9/2017    Eye Exam 06/09/2018 6/9/2017    Urine Microalbumin 06/09/2018 6/9/2017    High Dose Statin 01/12/2019 1/12/2018    Pneumococcal PPSV23 (Medium Risk) (2) 08/27/2022 11/25/2013    " Colonoscopy 04/12/2023 4/12/2013 (Done)    Override on 4/12/2013: Done (polyp)

## 2018-03-21 NOTE — PROGRESS NOTES
Assessment & Plan  Problem List Items Addressed This Visit        Cardiac/Vascular    HTN (hypertension) (Chronic)    Current Assessment & Plan     The current medical regimen is effective;  continue present plan and medications. Recheck lipids         Relevant Orders    Lipid panel       Endocrine    Diabetes mellitus with peripheral vascular disease (Chronic)    Current Assessment & Plan     Recheck labs.         Relevant Orders    Hemoglobin A1c       Other    JT on CPAP (Chronic)    Overview     CPAP 12 with FFM         Current Assessment & Plan     Tolerating settings well.  Getting much better rest.  Wearing this for 7-8 hours nightly.             Other Visit Diagnoses     Hospital discharge follow-up    -  Primary  -    Requesting outside records.  Incision site is well healed.  Completed PO antibiotics.  No subjective symptoms.  Monitor.     Excoriation of face, initial encounter    -  Start mupirocin.  Excoriation likely from CPAP mask.  Discussed options for a barrier method until this has healed.     Relevant Medications    mupirocin (BACTROBAN) 2 % ointment    Chronic allergic rhinitis, unspecified seasonality, unspecified trigger    -  Add montelukast.     Relevant Medications    montelukast (SINGULAIR) 10 mg tablet            Health Maintenance reviewed, deferred.    Follow-up: Follow-up in about 3 months (around 6/21/2018) for follow up for chronic conditions.    ______________________________________________________________________    Chief Complaint  Chief Complaint   Patient presents with    Hospital Follow Up     [facility]       HPI  Silvano Becker is a 62 y.o. male with multiple medical diagnoses as listed in the medical history and problem list that presents for hospital follow up.  Pt is known to me with his last appointment 1/11/2018.      He was admitted to an outside hospital for a reported axillary abscess.  There was some concern for potential bacteremia with his pacer wires but it seems  that this did not occur by his history.  Of note, I do not have a d/c summary.  He does have some labs from his hospitalization that are stable.      Feels much better.  He seems to have completed a week of linezolid PO.  No F/C/NS, pain, swelling.  He has an excoriation on his right cheek from his CPAP mask that he would like looked at.  Also reports that he is having some coughing and posterior pharynx secretions.  Denies any increased leg swelling, orthopnea.  He is feeling much better with the use of his CPAP.  He has a nebulizer now as well and has been needing this ~ BID with the pollen bloom that is occurring.  He is using his BREO as prescribed.        PAST MEDICAL HISTORY:  Past Medical History:   Diagnosis Date    Angioedema     Anticoagulant long-term use     Atrial fibrillation     CHF (congestive heart failure)     COPD (chronic obstructive pulmonary disease)     Coronary artery disease     Diabetes mellitus     Encounter for blood transfusion     Gout     High fever     Hypertension     ICD (implantable cardioverter-defibrillator) in place     Malignant hyperthermia     PAD (peripheral artery disease)        PAST SURGICAL HISTORY:  Past Surgical History:   Procedure Laterality Date    ABDOMINAL SURGERY      APPENDECTOMY      CARDIAC DEFIBRILLATOR PLACEMENT      CARDIAC STENTS       X 1    gastric bleed      HEMORRHOID SURGERY      peripheral stents      right leg and right iliac according to patient       SOCIAL HISTORY:  Social History     Social History    Marital status:      Spouse name: N/A    Number of children: N/A    Years of education: N/A     Occupational History    Not on file.     Social History Main Topics    Smoking status: Current Every Day Smoker     Packs/day: 1.50     Years: 40.00     Last attempt to quit: 10/13/2016    Smokeless tobacco: Never Used      Comment: recently quit smoking    Alcohol use 0.0 oz/week      Comment: occasional    Drug use:  No    Sexual activity: Not on file     Other Topics Concern    Not on file     Social History Narrative    No narrative on file       FAMILY HISTORY:  Family History   Problem Relation Age of Onset    Heart disease Mother        ALLERGIES AND MEDICATIONS: updated and reviewed.  Review of patient's allergies indicates:   Allergen Reactions    Ace inhibitors Edema     Angioedema treated at [facility] in ICU no intubation. ALL ACE Inhibitors.    Arb-angiotensin receptor antagonist Swelling     Angioedema while on ACE-I    Lisinopril Swelling     Current Outpatient Prescriptions   Medication Sig Dispense Refill    ACCU-CHEK FRANK Misc   1    albuterol (PROAIR HFA) 90 mcg/actuation inhaler Inhale 1-2 puffs into the lungs every 4 (four) hours as needed for Wheezing. 18 g 5    allopurinol (ZYLOPRIM) 300 MG tablet take 1 tablet by mouth once daily 90 tablet 3    amiodarone (PACERONE) 200 MG Tab take 1 tablet by mouth every morning 60 tablet 3    amLODIPine (NORVASC) 5 MG tablet take 1 tablet by mouth once daily 30 tablet 6    atorvastatin (LIPITOR) 80 MG tablet take 1 tablet by mouth every evening 30 tablet 11    blood sugar diagnostic Strp accu-chek frank smartview meter 100 each 11    carvedilol (COREG) 25 MG tablet take 1 tablet by mouth twice a day 180 tablet 3    cloNIDine (CATAPRES) 0.1 MG tablet Take 0.1 mg by mouth once daily.  0    clopidogrel (PLAVIX) 75 mg tablet take 1 tablet by mouth once daily 30 tablet 11    colchicine 0.6 mg tablet Take 0.6 mg by mouth daily as needed.   0    fluticasone-vilanterol (BREO) 200-25 mcg/dose DsDv diskus inhaler Inhale 1 puff into the lungs once daily. 30 each 11    furosemide (LASIX) 40 MG tablet Take 1 tablet (40 mg total) by mouth 2 (two) times daily. 90 tablet 3    gabapentin (NEURONTIN) 600 MG tablet take 2 tablets by mouth three times a day 180 tablet 11    glipiZIDE (GLUCOTROL) 5 MG tablet take 1 tablet by mouth every morning 30 tablet 5    hydrALAZINE  (APRESOLINE) 10 MG tablet   0    hydrALAZINE (APRESOLINE) 25 MG tablet take 1 tablet by mouth three times a day with food  0    isosorbide dinitrate (ISORDIL) 20 MG tablet Take 20 mg by mouth 3 (three) times daily.  0    isosorbide mononitrate (IMDUR) 30 MG 24 hr tablet Take 30 mg by mouth every morning.  0    linaclotide 72 mcg Cap Take 1 capsule (72 mcg total) by mouth once daily. 90 capsule 3    metOLazone (ZAROXOLYN) 2.5 MG tablet   0    nitroGLYCERIN (NITROSTAT) 0.4 MG SL tablet Place 1 tablet (0.4 mg total) under the tongue every 5 (five) minutes as needed for Chest pain. 60 tablet 3    pantoprazole (PROTONIX) 40 MG tablet take 1 tablet by mouth once daily 30 tablet 6    sertraline (ZOLOFT) 100 MG tablet Take 1 tablet (100 mg total) by mouth once daily. 90 tablet 0    SPIRIVA RESPIMAT 2.5 mcg/actuation Mist inhale 2 PUFFS INTO LUNGS ONCE A DAY. THIS IS A CONTROLLER MEDICATION AND SHOULD BE TAKEN EVERY DAY 4 g 6    spironolactone (ALDACTONE) 25 MG tablet Take 1 tablet (25 mg total) by mouth once daily. 90 tablet 3    spironolactone (ALDACTONE) 25 MG tablet take 1 tablet by mouth once daily 90 tablet 3    torsemide (DEMADEX) 20 MG Tab   0    VENTOLIN HFA 90 mcg/actuation inhaler inhale 1 to 2 puffs by mouth every 4 hours if needed 18 Inhaler 5    montelukast (SINGULAIR) 10 mg tablet Take 1 tablet (10 mg total) by mouth nightly as needed (allergies). 30 tablet 11    mupirocin (BACTROBAN) 2 % ointment Apply topically 3 (three) times daily. 30 g 1     No current facility-administered medications for this visit.          ROS  Review of Systems   Constitutional: Negative for chills, fever and unexpected weight change.   HENT: Positive for postnasal drip. Negative for congestion, dental problem, ear pain, hearing loss, rhinorrhea, sore throat and trouble swallowing.    Eyes: Negative for discharge, redness and visual disturbance.   Respiratory: Positive for cough. Negative for chest tightness, shortness  "of breath and wheezing.    Cardiovascular: Negative for chest pain, palpitations and leg swelling.   Gastrointestinal: Negative for abdominal pain, constipation, diarrhea, nausea and vomiting.   Endocrine: Negative for polydipsia, polyphagia and polyuria.   Genitourinary: Negative for decreased urine volume, dysuria and hematuria.   Musculoskeletal: Negative for arthralgias and myalgias.   Skin: Positive for color change. Negative for rash.   Neurological: Negative for dizziness, weakness, light-headedness and headaches.   Psychiatric/Behavioral: Negative for decreased concentration, dysphoric mood, sleep disturbance and suicidal ideas.           Physical Exam  Vitals:    03/21/18 1440 03/21/18 1506   BP: (!) 140/70 110/66   Pulse: 96    Temp: 98.3 °F (36.8 °C)    SpO2: 97%    Weight: 100.2 kg (221 lb)    Height: 5' 7" (1.702 m)     Body mass index is 34.61 kg/m².  Weight: 100.2 kg (221 lb)   Height: 5' 7" (170.2 cm)   Physical Exam   Constitutional: He is oriented to person, place, and time. He appears well-developed and well-nourished. No distress.   HENT:   Head: Normocephalic and atraumatic.   Eyes: Conjunctivae, EOM and lids are normal. Pupils are equal, round, and reactive to light. No scleral icterus.   Neck: Full passive range of motion without pain. Neck supple. No JVD present. Carotid bruit is not present. No thyromegaly present.   Cardiovascular: Normal rate, regular rhythm and intact distal pulses.  Exam reveals no S3, no S4 and no friction rub.    Murmur heard.  Pulmonary/Chest: Effort normal and breath sounds normal. He has no wheezes. He has no rhonchi. He has no rales.   Abdominal: Soft. Bowel sounds are normal. There is no tenderness.   Musculoskeletal: He exhibits no edema or tenderness.   Lymphadenopathy:        Head (right side): No submental and no submandibular adenopathy present.        Head (left side): No submental and no submandibular adenopathy present.     He has no cervical adenopathy. "        Right: No supraclavicular adenopathy present.        Left: No supraclavicular adenopathy present.   Neurological: He is alert and oriented to person, place, and time.   Motor grossly intact.  Sensory grossly intact.  Symmetric facial movements palate elevated symmetrically tongue midline   Skin: Skin is warm and dry. Lesion (excoriation of the right cheek with some surrounding erythema and central small eschar.  total lesion is a dime in size) noted. No rash noted.   Psychiatric: He has a normal mood and affect. His speech is normal and behavior is normal. Thought content normal.         Health Maintenance       Date Due Completion Date    TETANUS VACCINE 08/27/1975 ---    Zoster Vaccine 08/27/2017 ---    Hemoglobin A1c 12/09/2017 6/9/2017    Foot Exam 06/05/2018 6/5/2017    Lipid Panel 06/09/2018 6/9/2017    Eye Exam 06/09/2018 6/9/2017    Urine Microalbumin 06/09/2018 6/9/2017    High Dose Statin 03/21/2019 3/21/2018    Pneumococcal PPSV23 (Medium Risk) (2) 08/27/2022 11/25/2013    Colonoscopy 04/12/2023 4/12/2013 (Done)    Override on 4/12/2013: Done (polyp)

## 2018-03-27 ENCOUNTER — CLINICAL SUPPORT (OUTPATIENT)
Dept: SMOKING CESSATION | Facility: CLINIC | Age: 63
End: 2018-03-27
Payer: COMMERCIAL

## 2018-03-27 DIAGNOSIS — F17.200 NICOTINE DEPENDENCE: Primary | ICD-10-CM

## 2018-03-27 PROCEDURE — 99407 BEHAV CHNG SMOKING > 10 MIN: CPT | Mod: S$GLB,,, | Performed by: INTERNAL MEDICINE

## 2018-03-28 ENCOUNTER — PATIENT MESSAGE (OUTPATIENT)
Dept: FAMILY MEDICINE | Facility: CLINIC | Age: 63
End: 2018-03-28

## 2018-03-30 LAB — HBA1C MFR BLD: 5.2 % (ref 4–5.6)

## 2018-04-07 DIAGNOSIS — F41.9 ANXIETY: ICD-10-CM

## 2018-04-08 RX ORDER — SERTRALINE HYDROCHLORIDE 100 MG/1
TABLET, FILM COATED ORAL
Qty: 90 TABLET | Refills: 3 | Status: SHIPPED | OUTPATIENT
Start: 2018-04-08

## 2018-04-09 LAB
ERYTHROCYTE [DISTWIDTH] IN BLOOD BY AUTOMATED COUNT: 18.4 % (ref 12–15)
HCT VFR BLD AUTO: 30 % (ref 39–55)
HGB BLD-MCNC: 9 G/DL (ref 13–18)
MCH RBC QN AUTO: 28 PG (ref 25.4–34.6)
MCHC RBC AUTO-ENTMCNC: 30.5 G/DL (ref 32.5–35.5)
MCV RBC AUTO: 91.9 FL (ref 86–98)
NUCLEATED RBC/100 LEUKOCYTES: 0 (ref 0–0.2)
PLATELETS: 370 (ref 140–440)
PMV BLD AUTO: 10.8 FL (ref 9.4–12.4)
RBC # BLD AUTO: 3.21 10*6/UL (ref 4.3–5.93)
WBC: 6.4 (ref 4.5–11)

## 2018-04-10 LAB
ALBUMIN/GLOB SERPL ELPH: 0.8 {RATIO}
ALBUMIN: 3.6 (ref 3.4–5)
ALP ISOS SERPL LEV INH-CCNC: 116 U/L (ref 45–117)
ALT SERPL-CCNC: 18 U/L (ref 16–61)
ANION GAP SERPL CALC-SCNC: 9 MMOL/L (ref 5–14)
AST SERPL-CCNC: 44 U/L (ref 15–37)
BILIRUBIN, TOTAL: 0.6 (ref 0.2–1)
BUN BLD-MCNC: 32 MG/DL (ref 7–18)
BUN/CREAT SERPL: 19
CALCIUM SERPL-MCNC: 8.9 MG/DL (ref 8.5–10.1)
CHLORIDE: 108 (ref 98–107)
CO2 SERPL-SCNC: 27 MMOL/L (ref 21–32)
CREAT SERPL-MCNC: 1.7 MG/DL (ref 0.7–1.3)
EGFR IF AFRICAN AMERICAN: 52
EST. GFR  (NON AFRICAN AMERICAN): 43 MG/DL
GLUCOSE: 103 (ref 65–99)
POTASSIUM: 3.6 (ref 3.5–5.1)
PROT SNV-MCNC: 8.2 G/L (ref 6.4–8.2)
SODIUM: 144 (ref 136–145)

## 2018-04-11 ENCOUNTER — PATIENT MESSAGE (OUTPATIENT)
Dept: FAMILY MEDICINE | Facility: CLINIC | Age: 63
End: 2018-04-11

## 2018-04-11 RX ORDER — ALBUTEROL SULFATE 0.83 MG/ML
SOLUTION RESPIRATORY (INHALATION)
Qty: 300 ML | Refills: 4 | Status: SHIPPED | OUTPATIENT
Start: 2018-04-11

## 2018-04-12 DIAGNOSIS — E11.69 COMBINED HYPERLIPIDEMIA ASSOCIATED WITH TYPE 2 DIABETES MELLITUS: ICD-10-CM

## 2018-04-12 DIAGNOSIS — E78.2 COMBINED HYPERLIPIDEMIA ASSOCIATED WITH TYPE 2 DIABETES MELLITUS: ICD-10-CM

## 2018-04-12 RX ORDER — GLIPIZIDE 5 MG/1
TABLET ORAL
Qty: 30 TABLET | Refills: 5 | Status: SHIPPED | OUTPATIENT
Start: 2018-04-12

## 2018-04-17 ENCOUNTER — PATIENT MESSAGE (OUTPATIENT)
Dept: FAMILY MEDICINE | Facility: CLINIC | Age: 63
End: 2018-04-17

## 2018-04-17 ENCOUNTER — OFFICE VISIT (OUTPATIENT)
Dept: FAMILY MEDICINE | Facility: CLINIC | Age: 63
End: 2018-04-17
Payer: MEDICAID

## 2018-04-17 VITALS
BODY MASS INDEX: 33.9 KG/M2 | SYSTOLIC BLOOD PRESSURE: 120 MMHG | DIASTOLIC BLOOD PRESSURE: 80 MMHG | WEIGHT: 216 LBS | OXYGEN SATURATION: 95 % | HEIGHT: 67 IN | HEART RATE: 76 BPM | TEMPERATURE: 97 F

## 2018-04-17 DIAGNOSIS — F17.200 TOBACCO DEPENDENCE: Chronic | ICD-10-CM

## 2018-04-17 DIAGNOSIS — I10 ESSENTIAL HYPERTENSION: Chronic | ICD-10-CM

## 2018-04-17 DIAGNOSIS — G47.33 OSA ON CPAP: Chronic | ICD-10-CM

## 2018-04-17 DIAGNOSIS — F41.9 ANXIETY: ICD-10-CM

## 2018-04-17 DIAGNOSIS — J44.9 CHRONIC OBSTRUCTIVE PULMONARY DISEASE, UNSPECIFIED COPD TYPE: Chronic | ICD-10-CM

## 2018-04-17 DIAGNOSIS — I25.10 CORONARY ARTERY DISEASE DUE TO LIPID RICH PLAQUE: Chronic | ICD-10-CM

## 2018-04-17 DIAGNOSIS — E78.1 PURE HYPERGLYCERIDEMIA: Chronic | ICD-10-CM

## 2018-04-17 DIAGNOSIS — E11.51 DIABETES MELLITUS WITH PERIPHERAL VASCULAR DISEASE: Chronic | ICD-10-CM

## 2018-04-17 DIAGNOSIS — I50.42 CHRONIC COMBINED SYSTOLIC AND DIASTOLIC HEART FAILURE: Chronic | ICD-10-CM

## 2018-04-17 DIAGNOSIS — N18.30 ANEMIA IN STAGE 3 CHRONIC KIDNEY DISEASE: Chronic | ICD-10-CM

## 2018-04-17 DIAGNOSIS — D63.1 ANEMIA IN STAGE 3 CHRONIC KIDNEY DISEASE: Chronic | ICD-10-CM

## 2018-04-17 DIAGNOSIS — I25.83 CORONARY ARTERY DISEASE DUE TO LIPID RICH PLAQUE: Chronic | ICD-10-CM

## 2018-04-17 DIAGNOSIS — I73.9 PAD (PERIPHERAL ARTERY DISEASE): Chronic | ICD-10-CM

## 2018-04-17 DIAGNOSIS — Z09 HOSPITAL DISCHARGE FOLLOW-UP: Primary | ICD-10-CM

## 2018-04-17 PROCEDURE — 99214 OFFICE O/P EST MOD 30 MIN: CPT | Mod: S$PBB,,, | Performed by: INTERNAL MEDICINE

## 2018-04-17 PROCEDURE — 99999 PR PBB SHADOW E&M-EST. PATIENT-LVL III: CPT | Mod: PBBFAC,,, | Performed by: INTERNAL MEDICINE

## 2018-04-17 PROCEDURE — 99213 OFFICE O/P EST LOW 20 MIN: CPT | Mod: PBBFAC,PO | Performed by: INTERNAL MEDICINE

## 2018-04-17 RX ORDER — APIXABAN 5 MG/1
5 TABLET, FILM COATED ORAL 2 TIMES DAILY
Refills: 0 | COMMUNITY
Start: 2018-04-16

## 2018-04-17 RX ORDER — ISOSORBIDE DINITRATE 10 MG/1
10 TABLET ORAL 3 TIMES DAILY
Refills: 0 | COMMUNITY
Start: 2018-04-16

## 2018-04-17 RX ORDER — CLONAZEPAM 1 MG/1
TABLET ORAL
Refills: 0 | COMMUNITY
Start: 2018-04-10

## 2018-04-17 RX ORDER — LINACLOTIDE 290 UG/1
CAPSULE, GELATIN COATED ORAL
Refills: 0 | COMMUNITY
Start: 2018-04-11

## 2018-04-18 NOTE — ASSESSMENT & PLAN NOTE
I asked him to discuss his daytime use of this with his pulmonologist.  Will attempt to get records.

## 2018-04-18 NOTE — PROGRESS NOTES
Assessment & Plan  Problem List Items Addressed This Visit        Psychiatric    Anxiety    Current Assessment & Plan     I had a lengthy discussion with the patient regarding the risks of long term BZD use including but not limited to worsening anxiety symptoms, increased risk of dementia, development of dependence, risk of respiratory suppression.   I asked that he discuss this with his pulmonologist first.                Pulmonary    COPD (chronic obstructive pulmonary disease) (Chronic)    Overview     [facility].         Current Assessment & Plan     Requesting records            Cardiac/Vascular    Essential hypertension    Current Assessment & Plan     The current medical regimen is effective;  continue present plan and medications.          Chronic combined systolic and diastolic heart failure (Chronic)    Overview     Dr. Warren.  [facility] cardiology         Current Assessment & Plan     The current medical regimen is effective;  continue present plan and medications.          Pure hyperglyceridemia (Chronic)    Current Assessment & Plan     Requesting records         PAD (peripheral artery disease) (Chronic)    Overview     Followed by [facility]         Current Assessment & Plan     Requesting records         Coronary artery disease due to lipid rich plaque (Chronic)    Overview     Followed by [facility] cardiology         Current Assessment & Plan     The current medical regimen is effective;  continue present plan and medications.             Oncology    Anemia in chronic renal disease (Chronic)    Current Assessment & Plan     Stable.  Monitor             Endocrine    Diabetes mellitus with peripheral vascular disease (Chronic)    Current Assessment & Plan     Requesting outside records.  May need repeat A1c if not done as an inpatient.             Other    JT on CPAP (Chronic)    Overview     CPAP 12 with [facility]         Current Assessment & Plan     I asked him to discuss his daytime use of this with  his pulmonologist.  Will attempt to get records.          Tobacco dependence (Chronic)    Current Assessment & Plan     Continue with smoking cessation to get to total cessation.            Other Visit Diagnoses     Hospital discharge follow-up    -  Primary  -    Seemingly improved but I am missing a lot of his records.  I did discuss with the patient and his wife my concerns about being able to coordinate his care since he is primarily seeing [facility] providers.  We will continue to attempt get records as best as we can but this has proven to be very difficult in the past.             Health Maintenance reviewed, requesting records.    Follow-up: Follow-up in about 4 weeks (around 5/15/2018) for follow up for chronic conditions.    Addendum 4/26/18: I received his D/C summary from [facility].  Admitted 3/30/18 and discharged 4/10/18.  Had acute respiratory failure thought to be due to acute CHF exacerbation.  Also treated for COPD exacerbation.  Had BiPAP, steroids, ABx, diuresis.  Given strict sodium and fluid restriction plan.  Also noted to have HAFSA on his CKD-III.        Cesar Elaine   ______________________________________________________________________    Chief Complaint  Chief Complaint   Patient presents with    Hospital Follow Up       HPI  Silvano Becker is a 62 y.o. male with multiple medical diagnoses as listed in the medical history and problem list that presents for hospital follow up.  Pt is known to me with his last appointment 3/21/2018.  He is present with his wife.  I do not have a discharge summary.  This is his 3rd hospitalization of 2018    Pt reports that he was admitted for 2 weeks at [facility] for hypoxia, SOB, and what sounds like heart failure exacerbation.  He reports needed diuresis and BIPAP.  There was a question of needing ambulatory oxygen it seems.  He is followed by cardiology and pulmonology at [facility] as well.  I do not have any recent records of this.      He states that he is feeling  much better.  He is following a fluid restriction of around a liter a day and has been on a sodium restriction of around 1000mg daily.  No current CP, palpitations, SOB.  He reports that he was started on anticoagulation for apparent Afib but again, I have no records to review.  He does report that he is sometimes having to use his CPAP during the day while he is awake because of RODRIGUEZ.  He hasn't seen his pulmonologist about this.     He also states that the hospitalist advised that he talk to me about using clonazepam.  States that he gets anxious when he wears his CPAP      PAST MEDICAL HISTORY:  Past Medical History:   Diagnosis Date    Angioedema     Anticoagulant long-term use     Atrial fibrillation     CHF (congestive heart failure)     COPD (chronic obstructive pulmonary disease)     Coronary artery disease     Diabetes mellitus     Encounter for blood transfusion     Gout     High fever     Hypertension     ICD (implantable cardioverter-defibrillator) in place     Malignant hyperthermia     PAD (peripheral artery disease)        PAST SURGICAL HISTORY:  Past Surgical History:   Procedure Laterality Date    ABDOMINAL SURGERY      APPENDECTOMY      CARDIAC DEFIBRILLATOR PLACEMENT      CARDIAC STENTS       X 1    gastric bleed      HEMORRHOID SURGERY      peripheral stents      right leg and right iliac according to patient       SOCIAL HISTORY:  Social History     Social History    Marital status:      Spouse name: N/A    Number of children: N/A    Years of education: N/A     Occupational History    Not on file.     Social History Main Topics    Smoking status: Current Every Day Smoker     Packs/day: 1.50     Years: 40.00     Last attempt to quit: 10/13/2016    Smokeless tobacco: Never Used      Comment: recently quit smoking    Alcohol use 0.0 oz/week      Comment: occasional    Drug use: No    Sexual activity: Not on file     Other Topics Concern    Not on file     Social  History Narrative    No narrative on file       FAMILY HISTORY:  Family History   Problem Relation Age of Onset    Heart disease Mother        ALLERGIES AND MEDICATIONS: updated and reviewed.  Review of patient's allergies indicates:   Allergen Reactions    Ace inhibitors Edema     Angioedema treated at [facility] in ICU no intubation. ALL ACE Inhibitors.    Arb-angiotensin receptor antagonist Swelling     Angioedema while on ACE-I    Lisinopril Swelling     Current Outpatient Prescriptions   Medication Sig Dispense Refill    ACCU-CHEK FRANK Misc   1    albuterol (PROAIR HFA) 90 mcg/actuation inhaler Inhale 1-2 puffs into the lungs every 4 (four) hours as needed for Wheezing. 18 g 5    albuterol (PROVENTIL) 2.5 mg /3 mL (0.083 %) nebulizer solution inhale contents of 1 vial in nebulizer four times a day if n 300 mL 4    allopurinol (ZYLOPRIM) 300 MG tablet take 1 tablet by mouth once daily 90 tablet 3    amiodarone (PACERONE) 200 MG Tab take 1 tablet by mouth every morning 60 tablet 3    amLODIPine (NORVASC) 5 MG tablet take 1 tablet by mouth once daily 30 tablet 6    atorvastatin (LIPITOR) 80 MG tablet take 1 tablet by mouth every evening 30 tablet 11    blood sugar diagnostic Strp accu-chek frank smartview meter 100 each 11    carvedilol (COREG) 25 MG tablet take 1 tablet by mouth twice a day 180 tablet 3    clonazePAM (KLONOPIN) 1 MG tablet TK 1 T PO QHS  0    cloNIDine (CATAPRES) 0.1 MG tablet Take 0.1 mg by mouth once daily.  0    clopidogrel (PLAVIX) 75 mg tablet take 1 tablet by mouth once daily 30 tablet 11    ELIQUIS 5 mg Tab Take 5 mg by mouth 2 (two) times daily.  0    fluticasone-vilanterol (BREO) 200-25 mcg/dose DsDv diskus inhaler Inhale 1 puff into the lungs once daily. 30 each 11    furosemide (LASIX) 40 MG tablet Take 1 tablet (40 mg total) by mouth 2 (two) times daily. 90 tablet 3    gabapentin (NEURONTIN) 600 MG tablet take 2 tablets by mouth three times a day 180 tablet 11     glipiZIDE (GLUCOTROL) 5 MG tablet take 1 tablet by mouth every morning 30 tablet 5    hydrALAZINE (APRESOLINE) 10 MG tablet   0    isosorbide dinitrate (ISORDIL) 10 MG tablet Take 10 mg by mouth 3 (three) times daily.  0    LINZESS 290 mcg Cap   0    metOLazone (ZAROXOLYN) 2.5 MG tablet   0    montelukast (SINGULAIR) 10 mg tablet Take 1 tablet (10 mg total) by mouth nightly as needed (allergies). 30 tablet 11    nitroGLYCERIN (NITROSTAT) 0.4 MG SL tablet Place 1 tablet (0.4 mg total) under the tongue every 5 (five) minutes as needed for Chest pain. 60 tablet 3    pantoprazole (PROTONIX) 40 MG tablet take 1 tablet by mouth once daily 30 tablet 6    sertraline (ZOLOFT) 100 MG tablet take 1 tablet by mouth once daily 90 tablet 3    SPIRIVA RESPIMAT 2.5 mcg/actuation Mist inhale 2 PUFFS INTO LUNGS ONCE A DAY. THIS IS A CONTROLLER MEDICATION AND SHOULD BE TAKEN EVERY DAY 4 g 6    spironolactone (ALDACTONE) 25 MG tablet Take 1 tablet (25 mg total) by mouth once daily. 90 tablet 3    spironolactone (ALDACTONE) 25 MG tablet take 1 tablet by mouth once daily 90 tablet 3    torsemide (DEMADEX) 20 MG Tab   0    VENTOLIN HFA 90 mcg/actuation inhaler inhale 1 to 2 puffs by mouth every 4 hours if needed 18 Inhaler 5    colchicine 0.6 mg tablet Take 0.6 mg by mouth daily as needed.   0    hydrALAZINE (APRESOLINE) 25 MG tablet take 1 tablet by mouth three times a day with food  0    isosorbide dinitrate (ISORDIL) 20 MG tablet Take 20 mg by mouth 3 (three) times daily.  0    isosorbide mononitrate (IMDUR) 30 MG 24 hr tablet Take 30 mg by mouth every morning.  0    linaclotide 72 mcg Cap Take 1 capsule (72 mcg total) by mouth once daily. 90 capsule 3    mupirocin (BACTROBAN) 2 % ointment Apply topically 3 (three) times daily. 30 g 1     No current facility-administered medications for this visit.          ROS  Review of Systems   Constitutional: Negative for chills, fever and unexpected weight change.   HENT:  "Negative for congestion, dental problem, ear pain, hearing loss, rhinorrhea, sore throat and trouble swallowing.    Eyes: Negative for discharge, redness and visual disturbance.   Respiratory: Positive for shortness of breath. Negative for cough, chest tightness and wheezing.    Cardiovascular: Negative for chest pain, palpitations and leg swelling.   Gastrointestinal: Negative for abdominal pain, constipation, diarrhea, nausea and vomiting.   Endocrine: Negative for polydipsia, polyphagia and polyuria.   Genitourinary: Negative for decreased urine volume, dysuria and hematuria.   Musculoskeletal: Negative for arthralgias and myalgias.   Skin: Negative for color change and rash.   Neurological: Negative for dizziness, weakness, light-headedness and headaches.   Psychiatric/Behavioral: Negative for decreased concentration, dysphoric mood, sleep disturbance and suicidal ideas. The patient is nervous/anxious.            Physical Exam  Vitals:    04/17/18 1328   BP: 120/80   Pulse: 76   Temp: 97.4 °F (36.3 °C)   SpO2: 95%   Weight: 98 kg (216 lb)   Height: 5' 7" (1.702 m)    Body mass index is 33.83 kg/m².  Weight: 98 kg (216 lb)   Height: 5' 7" (170.2 cm)   Physical Exam   Constitutional: He is oriented to person, place, and time. He appears well-developed and well-nourished. No distress.   HENT:   Head: Normocephalic and atraumatic.   Eyes: Conjunctivae, EOM and lids are normal. Pupils are equal, round, and reactive to light. No scleral icterus.   Neck: Full passive range of motion without pain. Neck supple. No JVD present. Carotid bruit is not present. No thyromegaly present.   Cardiovascular: Normal rate, regular rhythm, normal heart sounds and intact distal pulses.  Exam reveals no S3, no S4 and no friction rub.    No murmur heard.  Pulmonary/Chest: Effort normal and breath sounds normal. He has no wheezes. He has no rhonchi. He has no rales.   Abdominal: Soft. Bowel sounds are normal. There is no tenderness. "   Musculoskeletal: He exhibits no edema or tenderness.   Lymphadenopathy:        Head (right side): No submental and no submandibular adenopathy present.        Head (left side): No submental and no submandibular adenopathy present.     He has no cervical adenopathy.        Right: No supraclavicular adenopathy present.        Left: No supraclavicular adenopathy present.   Neurological: He is alert and oriented to person, place, and time.   Motor grossly intact.  Sensory grossly intact.  Symmetric facial movements palate elevated symmetrically tongue midline   Skin: Skin is warm and dry. No rash noted.   Psychiatric: He has a normal mood and affect. His speech is normal and behavior is normal. Thought content normal.         Health Maintenance       Date Due Completion Date    TETANUS VACCINE 08/27/1975 ---    Zoster Vaccine 08/27/2017 ---    Hemoglobin A1c 12/09/2017 6/9/2017    Foot Exam 06/05/2018 6/5/2017    Urine Microalbumin 06/09/2018 6/9/2017    Lipid Panel 06/09/2018 6/9/2017    Eye Exam 06/09/2018 6/9/2017    High Dose Statin 04/17/2019 4/17/2018    Pneumococcal PPSV23 (Medium Risk) (2) 08/27/2022 11/25/2013    Colonoscopy 04/12/2023 4/12/2013 (Done)    Override on 4/12/2013: Done (polyp)

## 2018-04-18 NOTE — ASSESSMENT & PLAN NOTE
I had a lengthy discussion with the patient regarding the risks of long term BZD use including but not limited to worsening anxiety symptoms, increased risk of dementia, development of dependence, risk of respiratory suppression.   I asked that he discuss this with his pulmonologist first.

## 2018-04-25 ENCOUNTER — TELEPHONE (OUTPATIENT)
Dept: ADMINISTRATIVE | Facility: HOSPITAL | Age: 63
End: 2018-04-25

## 2018-04-26 ENCOUNTER — PATIENT MESSAGE (OUTPATIENT)
Dept: FAMILY MEDICINE | Facility: CLINIC | Age: 63
End: 2018-04-26

## 2018-05-02 RX ORDER — LANCETS
1 EACH MISCELLANEOUS DAILY
Qty: 100 EACH | Refills: 11 | Status: SHIPPED | OUTPATIENT
Start: 2018-05-02

## 2018-05-04 ENCOUNTER — PATIENT MESSAGE (OUTPATIENT)
Dept: FAMILY MEDICINE | Facility: CLINIC | Age: 63
End: 2018-05-04

## 2018-05-04 DIAGNOSIS — F41.9 ANXIETY: Primary | ICD-10-CM

## 2018-05-08 ENCOUNTER — PATIENT MESSAGE (OUTPATIENT)
Dept: FAMILY MEDICINE | Facility: CLINIC | Age: 63
End: 2018-05-08

## 2018-05-08 DIAGNOSIS — G47.33 OSA ON CPAP: Chronic | ICD-10-CM

## 2018-05-09 ENCOUNTER — PATIENT MESSAGE (OUTPATIENT)
Dept: FAMILY MEDICINE | Facility: CLINIC | Age: 63
End: 2018-05-09

## 2018-05-09 RX ORDER — TRAZODONE HYDROCHLORIDE 50 MG/1
50 TABLET ORAL NIGHTLY PRN
Qty: 30 TABLET | Refills: 1 | Status: SHIPPED | OUTPATIENT
Start: 2018-05-09 | End: 2019-05-09

## 2018-05-10 ENCOUNTER — TELEPHONE (OUTPATIENT)
Dept: FAMILY MEDICINE | Facility: CLINIC | Age: 63
End: 2018-05-10

## 2018-05-10 NOTE — TELEPHONE ENCOUNTER
----- Message from Beatris Garcia sent at 5/10/2018  8:27 AM CDT -----  Contact: Steve Espitia's office   Steve with the [facility]'s office would like to know if Dr. Elaine would sign the patient's death certificate. Please call Steve at [phone].

## 2018-05-10 NOTE — TELEPHONE ENCOUNTER
----- Message from Beatris Garcia sent at 5/10/2018  8:27 AM CDT -----  Contact: Steve Espitia's office   Steve with the [facility]'s office would like to know if Dr. Elaine would sign the patient's death certificate. Please call Steve at [phone].

## 2022-03-23 NOTE — ASSESSMENT & PLAN NOTE
The current medical regimen is effective;  continue present plan and medications.    Detail Level: Detailed